# Patient Record
Sex: FEMALE | Race: OTHER | NOT HISPANIC OR LATINO
[De-identification: names, ages, dates, MRNs, and addresses within clinical notes are randomized per-mention and may not be internally consistent; named-entity substitution may affect disease eponyms.]

---

## 2017-05-10 ENCOUNTER — TRANSCRIPTION ENCOUNTER (OUTPATIENT)
Age: 37
End: 2017-05-10

## 2017-09-21 ENCOUNTER — INPATIENT (INPATIENT)
Facility: HOSPITAL | Age: 37
LOS: 3 days | Discharge: HOME | DRG: 342 | End: 2017-09-25
Attending: SURGERY | Admitting: SURGERY
Payer: COMMERCIAL

## 2017-09-21 VITALS
TEMPERATURE: 99 F | SYSTOLIC BLOOD PRESSURE: 146 MMHG | HEART RATE: 111 BPM | OXYGEN SATURATION: 96 % | RESPIRATION RATE: 18 BRPM | DIASTOLIC BLOOD PRESSURE: 88 MMHG

## 2017-09-21 LAB
BASOPHILS # BLD AUTO: 0 K/UL — SIGNIFICANT CHANGE UP (ref 0–0.2)
BASOPHILS NFR BLD AUTO: 0.2 % — SIGNIFICANT CHANGE UP (ref 0–2)
EOSINOPHIL # BLD AUTO: 0 K/UL — SIGNIFICANT CHANGE UP (ref 0–0.5)
EOSINOPHIL NFR BLD AUTO: 0.2 % — SIGNIFICANT CHANGE UP (ref 0–6)
HCT VFR BLD CALC: 39.3 % — SIGNIFICANT CHANGE UP (ref 34.5–45)
HGB BLD-MCNC: 13.2 G/DL — SIGNIFICANT CHANGE UP (ref 11.5–15.5)
LYMPHOCYTES # BLD AUTO: 1.6 K/UL — SIGNIFICANT CHANGE UP (ref 1–3.3)
LYMPHOCYTES # BLD AUTO: 10 % — LOW (ref 13–44)
MCHC RBC-ENTMCNC: 30.4 PG — SIGNIFICANT CHANGE UP (ref 27–34)
MCHC RBC-ENTMCNC: 33.5 GM/DL — SIGNIFICANT CHANGE UP (ref 32–36)
MCV RBC AUTO: 90.7 FL — SIGNIFICANT CHANGE UP (ref 80–100)
MONOCYTES # BLD AUTO: 1.1 K/UL — HIGH (ref 0–0.9)
MONOCYTES NFR BLD AUTO: 6.8 % — SIGNIFICANT CHANGE UP (ref 2–14)
NEUTROPHILS # BLD AUTO: 13.3 K/UL — HIGH (ref 1.8–7.4)
NEUTROPHILS NFR BLD AUTO: 82.7 % — HIGH (ref 43–77)
PLATELET # BLD AUTO: 246 K/UL — SIGNIFICANT CHANGE UP (ref 150–400)
RBC # BLD: 4.33 M/UL — SIGNIFICANT CHANGE UP (ref 3.8–5.2)
RBC # FLD: 12.5 % — SIGNIFICANT CHANGE UP (ref 10.3–14.5)
WBC # BLD: 16 K/UL — HIGH (ref 3.8–10.5)
WBC # FLD AUTO: 16 K/UL — HIGH (ref 3.8–10.5)

## 2017-09-21 PROCEDURE — 99285 EMERGENCY DEPT VISIT HI MDM: CPT | Mod: 25

## 2017-09-21 RX ORDER — ONDANSETRON 8 MG/1
4 TABLET, FILM COATED ORAL ONCE
Qty: 0 | Refills: 0 | Status: COMPLETED | OUTPATIENT
Start: 2017-09-21 | End: 2017-09-21

## 2017-09-21 RX ORDER — SODIUM CHLORIDE 9 MG/ML
3 INJECTION INTRAMUSCULAR; INTRAVENOUS; SUBCUTANEOUS ONCE
Qty: 0 | Refills: 0 | Status: COMPLETED | OUTPATIENT
Start: 2017-09-21 | End: 2017-09-21

## 2017-09-21 RX ORDER — SODIUM CHLORIDE 9 MG/ML
1000 INJECTION INTRAMUSCULAR; INTRAVENOUS; SUBCUTANEOUS ONCE
Qty: 0 | Refills: 0 | Status: COMPLETED | OUTPATIENT
Start: 2017-09-21 | End: 2017-09-21

## 2017-09-21 RX ORDER — ACETAMINOPHEN 500 MG
1000 TABLET ORAL ONCE
Qty: 0 | Refills: 0 | Status: COMPLETED | OUTPATIENT
Start: 2017-09-21 | End: 2017-09-21

## 2017-09-21 RX ADMIN — Medication 400 MILLIGRAM(S): at 23:23

## 2017-09-21 RX ADMIN — SODIUM CHLORIDE 3 MILLILITER(S): 9 INJECTION INTRAMUSCULAR; INTRAVENOUS; SUBCUTANEOUS at 23:22

## 2017-09-21 RX ADMIN — SODIUM CHLORIDE 1000 MILLILITER(S): 9 INJECTION INTRAMUSCULAR; INTRAVENOUS; SUBCUTANEOUS at 23:23

## 2017-09-21 RX ADMIN — ONDANSETRON 4 MILLIGRAM(S): 8 TABLET, FILM COATED ORAL at 23:23

## 2017-09-21 NOTE — ED PROVIDER NOTE - MEDICAL DECISION MAKING DETAILS
Upper abdominal pain migrating to RLQ x several hours. r/o appendicitis. CT a/p, labs, UA, pregnancy test. Tylenol and Zofran for Sx.

## 2017-09-21 NOTE — ED ADULT NURSE NOTE - OBJECTIVE STATEMENT
37 y.o female aaox3 ambulatory with h/o HTN p/w c/o intermittent generalized abdominal pain started this morning accompanied by nausea and chills, abdomen soft, nt/nd. Denies any blood in stool, no hematuria or dysuria. IV access inserted, due labs sent pending results.

## 2017-09-21 NOTE — ED PROVIDER NOTE - OBJECTIVE STATEMENT
37F with PMH of 2 vaginal deliveries p/w several hours of generalized abdominal pain a/w bloating and nausea. Pain started in the upper abdomen and migrated down. Pain was gradual in onset, waxing and waning. No change when eating or making a bowel movement (last BM toady after onset normal). No dysuria. No vomiting. Never had similar pain. No h/o gyn pathology. Took OTC gas medicine without improvement.

## 2017-09-22 ENCOUNTER — TRANSCRIPTION ENCOUNTER (OUTPATIENT)
Age: 37
End: 2017-09-22

## 2017-09-22 ENCOUNTER — RESULT REVIEW (OUTPATIENT)
Age: 37
End: 2017-09-22

## 2017-09-22 DIAGNOSIS — K35.80 UNSPECIFIED ACUTE APPENDICITIS: ICD-10-CM

## 2017-09-22 DIAGNOSIS — K37 UNSPECIFIED APPENDICITIS: ICD-10-CM

## 2017-09-22 LAB
ALBUMIN SERPL ELPH-MCNC: 4.5 G/DL — SIGNIFICANT CHANGE UP (ref 3.3–5)
ALP SERPL-CCNC: 61 U/L — SIGNIFICANT CHANGE UP (ref 40–120)
ALT FLD-CCNC: 41 U/L RC — SIGNIFICANT CHANGE UP (ref 10–45)
ANION GAP SERPL CALC-SCNC: 15 MMOL/L — SIGNIFICANT CHANGE UP (ref 5–17)
APPEARANCE UR: CLEAR — SIGNIFICANT CHANGE UP
AST SERPL-CCNC: 24 U/L — SIGNIFICANT CHANGE UP (ref 10–40)
BILIRUB SERPL-MCNC: 0.4 MG/DL — SIGNIFICANT CHANGE UP (ref 0.2–1.2)
BILIRUB UR-MCNC: NEGATIVE — SIGNIFICANT CHANGE UP
BLD GP AB SCN SERPL QL: NEGATIVE — SIGNIFICANT CHANGE UP
BUN SERPL-MCNC: 8 MG/DL — SIGNIFICANT CHANGE UP (ref 7–23)
CALCIUM SERPL-MCNC: 9.1 MG/DL — SIGNIFICANT CHANGE UP (ref 8.4–10.5)
CHLORIDE SERPL-SCNC: 104 MMOL/L — SIGNIFICANT CHANGE UP (ref 96–108)
CO2 SERPL-SCNC: 23 MMOL/L — SIGNIFICANT CHANGE UP (ref 22–31)
COLOR SPEC: YELLOW — SIGNIFICANT CHANGE UP
CREAT SERPL-MCNC: 0.8 MG/DL — SIGNIFICANT CHANGE UP (ref 0.5–1.3)
DIFF PNL FLD: NEGATIVE — SIGNIFICANT CHANGE UP
GLUCOSE SERPL-MCNC: 112 MG/DL — HIGH (ref 70–99)
GLUCOSE UR QL: NEGATIVE — SIGNIFICANT CHANGE UP
HCG SERPL-ACNC: <2 MIU/ML — SIGNIFICANT CHANGE UP
HCG UR QL: NEGATIVE — SIGNIFICANT CHANGE UP
KETONES UR-MCNC: ABNORMAL
LEUKOCYTE ESTERASE UR-ACNC: NEGATIVE — SIGNIFICANT CHANGE UP
LIDOCAIN IGE QN: 21 U/L — SIGNIFICANT CHANGE UP (ref 7–60)
NITRITE UR-MCNC: NEGATIVE — SIGNIFICANT CHANGE UP
PH UR: 7.5 — SIGNIFICANT CHANGE UP (ref 5–8)
POTASSIUM SERPL-MCNC: 3.8 MMOL/L — SIGNIFICANT CHANGE UP (ref 3.5–5.3)
POTASSIUM SERPL-SCNC: 3.8 MMOL/L — SIGNIFICANT CHANGE UP (ref 3.5–5.3)
PROT SERPL-MCNC: 7.1 G/DL — SIGNIFICANT CHANGE UP (ref 6–8.3)
PROT UR-MCNC: NEGATIVE — SIGNIFICANT CHANGE UP
RH IG SCN BLD-IMP: POSITIVE — SIGNIFICANT CHANGE UP
SODIUM SERPL-SCNC: 142 MMOL/L — SIGNIFICANT CHANGE UP (ref 135–145)
SP GR SPEC: 1.02 — SIGNIFICANT CHANGE UP (ref 1.01–1.02)
UROBILINOGEN FLD QL: NEGATIVE — SIGNIFICANT CHANGE UP

## 2017-09-22 PROCEDURE — 88304 TISSUE EXAM BY PATHOLOGIST: CPT | Mod: 26

## 2017-09-22 PROCEDURE — 74177 CT ABD & PELVIS W/CONTRAST: CPT | Mod: 26

## 2017-09-22 RX ORDER — HEPARIN SODIUM 5000 [USP'U]/ML
5000 INJECTION INTRAVENOUS; SUBCUTANEOUS EVERY 8 HOURS
Qty: 0 | Refills: 0 | Status: DISCONTINUED | OUTPATIENT
Start: 2017-09-22 | End: 2017-09-25

## 2017-09-22 RX ORDER — ONDANSETRON 8 MG/1
4 TABLET, FILM COATED ORAL ONCE
Qty: 0 | Refills: 0 | Status: COMPLETED | OUTPATIENT
Start: 2017-09-22 | End: 2017-09-22

## 2017-09-22 RX ORDER — IBUPROFEN 200 MG
400 TABLET ORAL EVERY 6 HOURS
Qty: 0 | Refills: 0 | Status: DISCONTINUED | OUTPATIENT
Start: 2017-09-22 | End: 2017-09-22

## 2017-09-22 RX ORDER — FAMOTIDINE 10 MG/ML
40 INJECTION INTRAVENOUS ONCE
Qty: 0 | Refills: 0 | Status: COMPLETED | OUTPATIENT
Start: 2017-09-22 | End: 2017-09-22

## 2017-09-22 RX ORDER — ACETAMINOPHEN 500 MG
1000 TABLET ORAL ONCE
Qty: 0 | Refills: 0 | Status: DISCONTINUED | OUTPATIENT
Start: 2017-09-22 | End: 2017-09-23

## 2017-09-22 RX ORDER — ACETAMINOPHEN 500 MG
1000 TABLET ORAL ONCE
Qty: 0 | Refills: 0 | Status: COMPLETED | OUTPATIENT
Start: 2017-09-22 | End: 2017-09-22

## 2017-09-22 RX ORDER — HYDROMORPHONE HYDROCHLORIDE 2 MG/ML
0.5 INJECTION INTRAMUSCULAR; INTRAVENOUS; SUBCUTANEOUS
Qty: 0 | Refills: 0 | Status: DISCONTINUED | OUTPATIENT
Start: 2017-09-22 | End: 2017-09-22

## 2017-09-22 RX ORDER — MORPHINE SULFATE 50 MG/1
4 CAPSULE, EXTENDED RELEASE ORAL ONCE
Qty: 0 | Refills: 0 | Status: COMPLETED | OUTPATIENT
Start: 2017-09-22 | End: 2017-09-22

## 2017-09-22 RX ORDER — PIPERACILLIN AND TAZOBACTAM 4; .5 G/20ML; G/20ML
3.38 INJECTION, POWDER, LYOPHILIZED, FOR SOLUTION INTRAVENOUS EVERY 8 HOURS
Qty: 0 | Refills: 0 | Status: DISCONTINUED | OUTPATIENT
Start: 2017-09-22 | End: 2017-09-25

## 2017-09-22 RX ORDER — OXYCODONE AND ACETAMINOPHEN 5; 325 MG/1; MG/1
2 TABLET ORAL EVERY 4 HOURS
Qty: 0 | Refills: 0 | Status: DISCONTINUED | OUTPATIENT
Start: 2017-09-22 | End: 2017-09-22

## 2017-09-22 RX ORDER — KETOROLAC TROMETHAMINE 30 MG/ML
30 SYRINGE (ML) INJECTION EVERY 6 HOURS
Qty: 0 | Refills: 0 | Status: DISCONTINUED | OUTPATIENT
Start: 2017-09-22 | End: 2017-09-23

## 2017-09-22 RX ORDER — INFLUENZA VIRUS VACCINE 15; 15; 15; 15 UG/.5ML; UG/.5ML; UG/.5ML; UG/.5ML
0.5 SUSPENSION INTRAMUSCULAR ONCE
Qty: 0 | Refills: 0 | Status: DISCONTINUED | OUTPATIENT
Start: 2017-09-22 | End: 2017-09-25

## 2017-09-22 RX ORDER — PIPERACILLIN AND TAZOBACTAM 4; .5 G/20ML; G/20ML
3.38 INJECTION, POWDER, LYOPHILIZED, FOR SOLUTION INTRAVENOUS ONCE
Qty: 0 | Refills: 0 | Status: COMPLETED | OUTPATIENT
Start: 2017-09-22 | End: 2017-09-22

## 2017-09-22 RX ORDER — SODIUM CHLORIDE 9 MG/ML
1000 INJECTION, SOLUTION INTRAVENOUS
Qty: 0 | Refills: 0 | Status: DISCONTINUED | OUTPATIENT
Start: 2017-09-22 | End: 2017-09-22

## 2017-09-22 RX ORDER — OXYCODONE AND ACETAMINOPHEN 5; 325 MG/1; MG/1
1 TABLET ORAL EVERY 4 HOURS
Qty: 0 | Refills: 0 | Status: DISCONTINUED | OUTPATIENT
Start: 2017-09-22 | End: 2017-09-22

## 2017-09-22 RX ORDER — MORPHINE SULFATE 50 MG/1
4 CAPSULE, EXTENDED RELEASE ORAL ONCE
Qty: 0 | Refills: 0 | Status: DISCONTINUED | OUTPATIENT
Start: 2017-09-22 | End: 2017-09-22

## 2017-09-22 RX ADMIN — HEPARIN SODIUM 5000 UNIT(S): 5000 INJECTION INTRAVENOUS; SUBCUTANEOUS at 23:13

## 2017-09-22 RX ADMIN — FAMOTIDINE 40 MILLIGRAM(S): 10 INJECTION INTRAVENOUS at 02:45

## 2017-09-22 RX ADMIN — Medication 30 MILLIGRAM(S): at 23:13

## 2017-09-22 RX ADMIN — ONDANSETRON 4 MILLIGRAM(S): 8 TABLET, FILM COATED ORAL at 10:55

## 2017-09-22 RX ADMIN — HYDROMORPHONE HYDROCHLORIDE 0.5 MILLIGRAM(S): 2 INJECTION INTRAMUSCULAR; INTRAVENOUS; SUBCUTANEOUS at 08:07

## 2017-09-22 RX ADMIN — SODIUM CHLORIDE 75 MILLILITER(S): 9 INJECTION, SOLUTION INTRAVENOUS at 06:34

## 2017-09-22 RX ADMIN — MORPHINE SULFATE 4 MILLIGRAM(S): 50 CAPSULE, EXTENDED RELEASE ORAL at 02:59

## 2017-09-22 RX ADMIN — Medication 400 MILLIGRAM(S): at 06:00

## 2017-09-22 RX ADMIN — SODIUM CHLORIDE 75 MILLILITER(S): 9 INJECTION, SOLUTION INTRAVENOUS at 16:02

## 2017-09-22 RX ADMIN — Medication 1000 MILLIGRAM(S): at 06:25

## 2017-09-22 RX ADMIN — PIPERACILLIN AND TAZOBACTAM 200 GRAM(S): 4; .5 INJECTION, POWDER, LYOPHILIZED, FOR SOLUTION INTRAVENOUS at 03:01

## 2017-09-22 RX ADMIN — Medication 30 MILLIGRAM(S): at 16:35

## 2017-09-22 RX ADMIN — Medication 1000 MILLIGRAM(S): at 03:06

## 2017-09-22 RX ADMIN — PIPERACILLIN AND TAZOBACTAM 25 GRAM(S): 4; .5 INJECTION, POWDER, LYOPHILIZED, FOR SOLUTION INTRAVENOUS at 14:14

## 2017-09-22 RX ADMIN — PIPERACILLIN AND TAZOBACTAM 25 GRAM(S): 4; .5 INJECTION, POWDER, LYOPHILIZED, FOR SOLUTION INTRAVENOUS at 23:13

## 2017-09-22 RX ADMIN — HYDROMORPHONE HYDROCHLORIDE 0.5 MILLIGRAM(S): 2 INJECTION INTRAMUSCULAR; INTRAVENOUS; SUBCUTANEOUS at 07:51

## 2017-09-22 RX ADMIN — Medication 30 MILLIGRAM(S): at 17:10

## 2017-09-22 RX ADMIN — Medication 30 MILLIGRAM(S): at 23:43

## 2017-09-22 NOTE — DISCHARGE NOTE ADULT - HOSPITAL COURSE
37 year old woman presented to [facility] with abdominal pain and diagnosed with acute appendicitis. She was started on IV antibiotics and then taken to the operating room where she underwent a laparoscopic appendectomy. The patient tolerated the procedure well and was extubated and transported to the recovery room. Her diet was advanced as tolerated and she had a prescription for Percocet sent to the pharmacy. She was then discharged to home without any other issues. 37 year old woman presented to NS with abdominal pain and diagnosed with acute appendicitis. She was started on IV antibiotics and then taken to the operating room where she underwent a laparoscopic appendectomy. The patient tolerated the procedure well and was extubated and transported to the recovery room. Her diet was advanced as tolerated and she had a prescription for Percocet sent to the pharmacy. Patient experienced fever to 38.7C post operative and was mildly tachycardiac. She was admitted overnight for IV antibiotics and supportive care. At the time of discharge, the patient was hemodynamically stable, was tolerating PO diet, was voiding urine and passing stool, was ambulating, and was comfortable with adequate pain control. The patient was instructed to follow up with Dr. Bañuelos within 1-2 weeks after discharge from the hospital. The patient felt comfortable with discharge. The patient was discharged to home on oral antibiotics for 5 days. The patient had no other issues. 37 year old woman presented to NS with abdominal pain and diagnosed with acute appendicitis. She was started on IV antibiotics and then taken to the operating room where she underwent a laparoscopic appendectomy. The patient tolerated the procedure well and was extubated and transported to the recovery room. Her diet was advanced as tolerated and she had a prescription for Percocet sent to the pharmacy. Patient experienced fever to 38.7C post operative and was mildly tachycardiac. She was admitted overnight for IV antibiotics and supportive care. At the time of discharge, the patient was hemodynamically stable, was tolerating PO diet, was voiding urine and passing stool, was ambulating, and was comfortable with adequate pain control. The patient was instructed to follow up with Dr. Bañuelos within 1-2 weeks after discharge from the hospital. The patient felt comfortable with discharge. The patient was discharged to home on POD#1 on oral antibiotics for 5 days. The patient had no other issues. 37 year old woman presented to NS with abdominal pain and diagnosed with acute appendicitis. She was started on IV antibiotics and then taken to the operating room where she underwent a laparoscopic appendectomy. The patient tolerated the procedure well and was extubated and transported to the recovery room. Her diet was advanced as tolerated and she had a prescription for Percocet sent to the pharmacy. Patient experienced fever to 38.7C post operative and was mildly tachycardiac. She was admitted overnight for IV antibiotics and supportive care. At the time of discharge, the patient was hemodynamically stable, was tolerating PO diet, was voiding urine and passing stool, was ambulating, and was comfortable with adequate pain control. The patient was instructed to follow up with Dr. Bañuelos within 1-2 weeks after discharge from the hospital. The patient felt comfortable with discharge. The patient was discharged to home on oral antibiotics for 7 days. The patient had no other issues.

## 2017-09-22 NOTE — H&P ADULT - NSHPPHYSICALEXAM_GEN_ALL_CORE
Gen: resting comfortably, NAD  Chest: no resp distress  Abd: soft, mild diffuse tenderness, more in RLQ, non-distended

## 2017-09-22 NOTE — PROGRESS NOTE ADULT - SUBJECTIVE AND OBJECTIVE BOX
[facility] PROGRESS NOTE    POST OPERATIVE DAY #: 0      SUBJECTIVE: Pt seen in PACU. reporting some diffuse abdominal pain. Tmax 100.4 -/-      Vital Signs Last 24 Hrs  T(C): 37.3 (22 Sep 2017 07:30), Max: 38.7 (22 Sep 2017 06:45)  T(F): 99.1 (22 Sep 2017 07:30), Max: 101.7 (22 Sep 2017 06:45)  HR: 95 (22 Sep 2017 08:07) (81 - 114)  BP: 118/69 (22 Sep 2017 07:30) (113/76 - 152/86)  BP(mean): 87 (22 Sep 2017 07:30) (86 - 104)  RR: 16 (22 Sep 2017 08:07) (15 - 18)  SpO2: 94% (22 Sep 2017 08:07) (93% - 99%)    Physical Exam  General: awake, alert, in NAD    Pulm: respirations unlabored, no increased WOB  Abdomen: soft, mildly distended, appropriately tender, incision c/d/i  Extremities: Grossly symmetric    I&O's Summary    21 Sep 2017 07:  -  22 Sep 2017 07:00  --------------------------------------------------------  IN: 75 mL / OUT: 0 mL / NET: 75 mL    22 Sep 2017 07:  -  22 Sep 2017 08:23  --------------------------------------------------------  IN: 75 mL / OUT: 300 mL / NET: -225 mL      I&O's Detail    21 Sep 2017 07:  -  22 Sep 2017 07:00  --------------------------------------------------------  IN:    lactated ringers.: 75 mL  Total IN: 75 mL    OUT:  Total OUT: 0 mL    Total NET: 75 mL      22 Sep 2017 07:01  -  22 Sep 2017 08:23  --------------------------------------------------------  IN:    lactated ringers.: 75 mL  Total IN: 75 mL    OUT:    Voided: 300 mL  Total OUT: 300 mL    Total NET: -225 mL          MEDICATIONS  (STANDING):  lactated ringers. 1000 milliLiter(s) (75 mL/Hr) IV Continuous <Continuous>    MEDICATIONS  (PRN):  HYDROmorphone  Injectable 0.5 milliGRAM(s) IV Push every 10 minutes PRN Moderate Pain (4 - 6)  ondansetron Injectable 4 milliGRAM(s) IV Push once PRN Nausea and/or Vomiting  oxyCODONE    5 mG/acetaminophen 325 mG 1 Tablet(s) Oral every 4 hours PRN Moderate Pain (4 - 6)  oxyCODONE    5 mG/acetaminophen 325 mG 2 Tablet(s) Oral every 4 hours PRN Severe Pain (7 - 10)      LABS:                        13.2   16.0  )-----------( 246      ( 21 Sep 2017 23:36 )             39.3     09-21    142  |  104  |  8   ----------------------------<  112<H>  3.8   |  23  |  0.80    Ca    9.1      21 Sep 2017 23:36    TPro  7.1  /  Alb  4.5  /  TBili  0.4  /  DBili  x   /  AST  24  /  ALT  41  /  AlkPhos  61  -      Urinalysis Basic - ( 22 Sep 2017 00:14 )    Color: Yellow / Appearance: Clear / S.021 / pH: x  Gluc: x / Ketone: Trace  / Bili: Negative / Urobili: Negative   Blood: x / Protein: Negative / Nitrite: Negative   Leuk Esterase: Negative / RBC: x / WBC x   Sq Epi: x / Non Sq Epi: x / Bacteria: x        RADIOLOGY & ADDITIONAL STUDIES:  No new studies

## 2017-09-22 NOTE — DISCHARGE NOTE ADULT - MEDICATION SUMMARY - MEDICATIONS TO STOP TAKING
I will STOP taking the medications listed below when I get home from the hospital:    Levaquin 750 mg oral tablet  -- 1 tab(s) by mouth once a day  -- Avoid prolonged or excessive exposure to direct and/or artificial sunlight while taking this medication.  Do not take dairy products, antacids, or iron preparations within one hour of this medication.  Finish all this medication unless otherwise directed by prescriber.  May cause drowsiness or dizziness.  Medication should be taken with plenty of water.    Cipro 500 mg oral tablet  -- 1 tab(s) by mouth 2 times a day  -- Avoid prolonged or excessive exposure to direct and/or artificial sunlight while taking this medication.  Check with your doctor before becoming pregnant.  Do not take dairy products, antacids, or iron preparations within one hour of this medication.  Finish all this medication unless otherwise directed by prescriber.  Medication should be taken with plenty of water. I will STOP taking the medications listed below when I get home from the hospital:  None

## 2017-09-22 NOTE — BRIEF OPERATIVE NOTE - PROCEDURE
<<-----Click on this checkbox to enter Procedure Laparoscopic appendectomy  09/22/2017    Active  [id number]

## 2017-09-22 NOTE — H&P ADULT - NSHPLABSRESULTS_GEN_ALL_CORE
13.2   16.0  )-----------( 246      ( 21 Sep 2017 23:36 )             39.3   09-21    142  |  104  |  8   ----------------------------<  112<H>  3.8   |  23  |  0.80    Ca    9.1      21 Sep 2017 23:36    TPro  7.1  /  Alb  4.5  /  TBili  0.4  /  DBili  x   /  AST  24  /  ALT  41  /  AlkPhos  61  09-21

## 2017-09-22 NOTE — H&P ADULT - HISTORY OF PRESENT ILLNESS
Pt is 37F with no PMH, abd pain since 11am, the day prior to presentation.  Some nausea.  No vomiting. Chills but no fevers.  Came to the ED for evaluation, noted on CT scan to have acute appendicitis.

## 2017-09-22 NOTE — H&P ADULT - ASSESSMENT
Pt is 37F with acute appendicitis  -admit to surgery   -NPO/IVF  -OR pending for appendectomy    case discussed with attending, Dr. Allen

## 2017-09-22 NOTE — DISCHARGE NOTE ADULT - CARE PLAN
Principal Discharge DX:	Appendicitis  Goal:	s/p laparoscopic appendectomy  Instructions for follow-up, activity and diet:	Okay to shower In 24 hours. Leave steri-strips intact. Let soap and water run over incisions and then pat dry, do not scrub incisions.

## 2017-09-22 NOTE — DISCHARGE NOTE ADULT - PLAN OF CARE
s/p laparoscopic appendectomy Okay to shower In 24 hours. Leave steri-strips intact. Let soap and water run over incisions and then pat dry, do not scrub incisions.

## 2017-09-22 NOTE — PROGRESS NOTE ADULT - ASSESSMENT
38Yo female s/p laparoscopic appendectomy for uncomplicated appendicitis, POD 0, currently stable     - monitor vitals, patient currently afebrile   - start Zosyn   - DVT ppx  - CLD and advance as tolerated     [facility] [id number] 36YO female s/p laparoscopic appendectomy for uncomplicated appendicitis, POD 0, immediate post-op fever that since resolved, currently stable     - monitor vitals, patient currently afebrile   - start Zosyn   - DVT ppx  - CLD and advance as tolerated     [facility] [id number]

## 2017-09-22 NOTE — DISCHARGE NOTE ADULT - CARE PROVIDER_API CALL
Karis Bañuelos), ColonRectal Surgery; Surgery  [address] [facility]  [address] [place] [id number]  Phone: [phone]  Fax: [phone]

## 2017-09-22 NOTE — H&P ADULT - ATTENDING COMMENTS
38 yo female with acute abd pain, CT c/w acute appendicitis. I recommended lap appy, poss open, discussed details, R/B/A of the procedure, all questions answered.

## 2017-09-22 NOTE — DISCHARGE NOTE ADULT - MEDICATION SUMMARY - MEDICATIONS TO TAKE
I will START or STAY ON the medications listed below when I get home from the hospital:    Percocet 5/325 oral tablet  -- 1 tab(s) by mouth every 4 hours, As Needed -for moderate pain MDD:6 tabs   -- Caution federal law prohibits the transfer of this drug to any person other  than the person for whom it was prescribed.  May cause drowsiness.  Alcohol may intensify this effect.  Use care when operating dangerous machinery.  This prescription cannot be refilled.  This product contains acetaminophen.  Do not use  with any other product containing acetaminophen to prevent possible liver damage.  Using more of this medication than prescribed may cause serious breathing problems.    -- Indication: For PRN pain I will START or STAY ON the medications listed below when I get home from the hospital:    Augmentin 875 mg-125 mg oral tablet  -- 1 milligram(s) by mouth every 12 hours   -- Finish all this medication unless otherwise directed by prescriber.  Take with food or milk.    -- Indication: For Appendicitis I will START or STAY ON the medications listed below when I get home from the hospital:    Augmentin 875 mg-125 mg oral tablet  -- 1 milligram(s) by mouth every 12 hours   -- Finish all this medication unless otherwise directed by prescriber.  Take with food or milk.    -- Indication: For Antibiotic

## 2017-09-23 LAB
APPEARANCE UR: ABNORMAL
BILIRUB UR-MCNC: NEGATIVE — SIGNIFICANT CHANGE UP
COLOR SPEC: YELLOW — SIGNIFICANT CHANGE UP
COMMENT - URINE: SIGNIFICANT CHANGE UP
DIFF PNL FLD: NEGATIVE — SIGNIFICANT CHANGE UP
EPI CELLS # UR: SIGNIFICANT CHANGE UP /HPF
GLUCOSE UR QL: NEGATIVE — SIGNIFICANT CHANGE UP
KETONES UR-MCNC: NEGATIVE — SIGNIFICANT CHANGE UP
LEUKOCYTE ESTERASE UR-ACNC: NEGATIVE — SIGNIFICANT CHANGE UP
NITRITE UR-MCNC: NEGATIVE — SIGNIFICANT CHANGE UP
PH UR: 8 — SIGNIFICANT CHANGE UP (ref 5–8)
PROT UR-MCNC: 30 MG/DL
RBC CASTS # UR COMP ASSIST: SIGNIFICANT CHANGE UP /HPF (ref 0–2)
SP GR SPEC: 1.03 — HIGH (ref 1.01–1.02)
UROBILINOGEN FLD QL: 1

## 2017-09-23 PROCEDURE — 74000: CPT | Mod: 26

## 2017-09-23 PROCEDURE — 71010: CPT | Mod: 26

## 2017-09-23 RX ORDER — IBUPROFEN 200 MG
400 TABLET ORAL EVERY 6 HOURS
Qty: 0 | Refills: 0 | Status: DISCONTINUED | OUTPATIENT
Start: 2017-09-23 | End: 2017-09-25

## 2017-09-23 RX ORDER — POTASSIUM CHLORIDE 20 MEQ
40 PACKET (EA) ORAL ONCE
Qty: 0 | Refills: 0 | Status: COMPLETED | OUTPATIENT
Start: 2017-09-23 | End: 2017-09-23

## 2017-09-23 RX ORDER — ACETAMINOPHEN 500 MG
650 TABLET ORAL EVERY 6 HOURS
Qty: 0 | Refills: 0 | Status: DISCONTINUED | OUTPATIENT
Start: 2017-09-23 | End: 2017-09-25

## 2017-09-23 RX ORDER — ACETAMINOPHEN 500 MG
1000 TABLET ORAL ONCE
Qty: 0 | Refills: 0 | Status: COMPLETED | OUTPATIENT
Start: 2017-09-23 | End: 2017-09-23

## 2017-09-23 RX ADMIN — PIPERACILLIN AND TAZOBACTAM 25 GRAM(S): 4; .5 INJECTION, POWDER, LYOPHILIZED, FOR SOLUTION INTRAVENOUS at 22:06

## 2017-09-23 RX ADMIN — PIPERACILLIN AND TAZOBACTAM 25 GRAM(S): 4; .5 INJECTION, POWDER, LYOPHILIZED, FOR SOLUTION INTRAVENOUS at 13:52

## 2017-09-23 RX ADMIN — Medication 650 MILLIGRAM(S): at 13:49

## 2017-09-23 RX ADMIN — Medication 30 MILLIGRAM(S): at 06:44

## 2017-09-23 RX ADMIN — Medication 30 MILLIGRAM(S): at 06:14

## 2017-09-23 RX ADMIN — Medication 400 MILLIGRAM(S): at 16:30

## 2017-09-23 RX ADMIN — PIPERACILLIN AND TAZOBACTAM 25 GRAM(S): 4; .5 INJECTION, POWDER, LYOPHILIZED, FOR SOLUTION INTRAVENOUS at 05:13

## 2017-09-23 RX ADMIN — HEPARIN SODIUM 5000 UNIT(S): 5000 INJECTION INTRAVENOUS; SUBCUTANEOUS at 05:13

## 2017-09-23 RX ADMIN — Medication 400 MILLIGRAM(S): at 17:00

## 2017-09-23 RX ADMIN — HEPARIN SODIUM 5000 UNIT(S): 5000 INJECTION INTRAVENOUS; SUBCUTANEOUS at 13:50

## 2017-09-23 RX ADMIN — Medication 40 MILLIEQUIVALENT(S): at 13:48

## 2017-09-23 RX ADMIN — HEPARIN SODIUM 5000 UNIT(S): 5000 INJECTION INTRAVENOUS; SUBCUTANEOUS at 22:06

## 2017-09-23 RX ADMIN — Medication 400 MILLIGRAM(S): at 00:18

## 2017-09-23 NOTE — PROGRESS NOTE ADULT - ASSESSMENT
36YO female s/p laparoscopic appendectomy for uncomplicated appendicitis, POD 0, immediate post-op fever that since resolved, currently stable     - monitor vitals, patient currently afebrile   - C/w Zosyn   - DVT ppx  - Diet, Regular  - D/c to home    [facility] [id number] 36YO female s/p laparoscopic appendectomy for uncomplicated appendicitis, POD 0, immediate post-op fever that since resolved, currently stable     - ordered CXR and UA - fever possibly due to atelectasis POD #1  - monitor vitals, patient currently afebrile   - C/w Zosyn   - DVT ppx  - Diet, Regular  - D/c to home    [facility] [id number]

## 2017-09-23 NOTE — PROGRESS NOTE ADULT - SUBJECTIVE AND OBJECTIVE BOX
[facility] PROGRESS NOTE    SUBJECTIVE: Pt seen in PACU. reporting some diffuse abdominal pain. Tmax 100.4 -/-    Vital Signs Last 24 Hrs  T(C): 37.8 (23 Sep 2017 06:09), Max: 38.7 (22 Sep 2017 06:45)  T(F): 100 (23 Sep 2017 06:09), Max: 101.7 (22 Sep 2017 06:45)  HR: 80 (23 Sep 2017 06:09) (67 - 114)  BP: 121/81 (23 Sep 2017 06:09) (104/66 - 141/79)  BP(mean): 87 (22 Sep 2017 07:30) (86 - 104)  RR: 19 (23 Sep 2017 06:09) (14 - 19)  SpO2: 100% (23 Sep 2017 06:09) (93% - 100%)    Physical Exam  General: awake, alert, in NAD    Pulm: respirations unlabored, no increased WOB  Abdomen: soft, mildly distended, appropriately tender, incision c/d/i  Extremities: Grossly symmetric    I&O's Summary    21 Sep 2017 07:  -  22 Sep 2017 07:00  --------------------------------------------------------  IN: 75 mL / OUT: 0 mL / NET: 75 mL    22 Sep 2017 07:  -  23 Sep 2017 06:17  --------------------------------------------------------  IN: 1210 mL / OUT: 2450 mL / NET: -1240 mL      I&O's Detail    21 Sep 2017 07:  -  22 Sep 2017 07:00  --------------------------------------------------------  IN:    lactated ringers.: 75 mL  Total IN: 75 mL    OUT:  Total OUT: 0 mL    Total NET: 75 mL      22 Sep 2017 07:01  -  23 Sep 2017 06:17  --------------------------------------------------------  IN:    IV PiggyBack: 25 mL    lactated ringers.: 825 mL    Oral Fluid: 360 mL  Total IN: 1210 mL    OUT:    Voided: 2450 mL  Total OUT: 2450 mL    Total NET: -1240 mL          MEDICATIONS  (STANDING):  piperacillin/tazobactam IVPB. 3.375 Gram(s) IV Intermittent every 8 hours  heparin  Injectable 5000 Unit(s) SubCutaneous every 8 hours  influenza   Vaccine 0.5 milliLiter(s) IntraMuscular once    MEDICATIONS  (PRN):  ketorolac   Injectable 30 milliGRAM(s) IV Push every 6 hours PRN Moderate Pain (4 - 6)      LABS:                        13.2   16.0  )-----------( 246      ( 21 Sep 2017 23:36 )             39.3     -    142  |  104  |  8   ----------------------------<  112<H>  3.8   |  23  |  0.80    Ca    9.1      21 Sep 2017 23:36    TPro  7.1  /  Alb  4.5  /  TBili  0.4  /  DBili  x   /  AST  24  /  ALT  41  /  AlkPhos  61        Urinalysis Basic - ( 22 Sep 2017 00:14 )    Color: Yellow / Appearance: Clear / S.021 / pH: x  Gluc: x / Ketone: Trace  / Bili: Negative / Urobili: Negative   Blood: x / Protein: Negative / Nitrite: Negative   Leuk Esterase: Negative / RBC: x / WBC x   Sq Epi: x / Non Sq Epi: x / Bacteria: x        RADIOLOGY & ADDITIONAL STUDIES: [facility] PROGRESS NOTE    SUBJECTIVE: Pt seen in at bedside. Tmax 38.7C at 2322 and , received IV Tylenol. Currently afebrile and hemodynamically stable. Denies NV. Tolerating a regular diet. +/-    Vital Signs Last 24 Hrs  T(C): 37.8 (23 Sep 2017 06:09), Max: 38.7 (22 Sep 2017 06:45)  T(F): 100 (23 Sep 2017 06:09), Max: 101.7 (22 Sep 2017 06:45)  HR: 80 (23 Sep 2017 06:09) (67 - 114)  BP: 121/81 (23 Sep 2017 06:09) (104/66 - 141/79)  BP(mean): 87 (22 Sep 2017 07:30) (86 - 104)  RR: 19 (23 Sep 2017 06:09) (14 - 19)  SpO2: 100% (23 Sep 2017 06:09) (93% - 100%)    Physical Exam  General: awake, alert, in NAD    Pulm: respirations unlabored, no increased WOB  Abdomen: soft, mildly distended, appropriately tender, incision c/d/i  Extremities: Grossly symmetric    I&O's Summary    21 Sep 2017 07:  -  22 Sep 2017 07:00  --------------------------------------------------------  IN: 75 mL / OUT: 0 mL / NET: 75 mL    22 Sep 2017 07:  -  23 Sep 2017 06:17  --------------------------------------------------------  IN: 1210 mL / OUT: 2450 mL / NET: -1240 mL      I&O's Detail    21 Sep 2017 07:  -  22 Sep 2017 07:00  --------------------------------------------------------  IN:    lactated ringers.: 75 mL  Total IN: 75 mL    OUT:  Total OUT: 0 mL    Total NET: 75 mL      22 Sep 2017 07:01  -  23 Sep 2017 06:17  --------------------------------------------------------  IN:    IV PiggyBack: 25 mL    lactated ringers.: 825 mL    Oral Fluid: 360 mL  Total IN: 1210 mL    OUT:    Voided: 2450 mL  Total OUT: 2450 mL    Total NET: -1240 mL          MEDICATIONS  (STANDING):  piperacillin/tazobactam IVPB. 3.375 Gram(s) IV Intermittent every 8 hours  heparin  Injectable 5000 Unit(s) SubCutaneous every 8 hours  influenza   Vaccine 0.5 milliLiter(s) IntraMuscular once    MEDICATIONS  (PRN):  ketorolac   Injectable 30 milliGRAM(s) IV Push every 6 hours PRN Moderate Pain (4 - 6)      LABS:                        13.2   16.0  )-----------( 246      ( 21 Sep 2017 23:36 )             39.3     09-21    142  |  104  |  8   ----------------------------<  112<H>  3.8   |  23  |  0.80    Ca    9.1      21 Sep 2017 23:36    TPro  7.1  /  Alb  4.5  /  TBili  0.4  /  DBili  x   /  AST  24  /  ALT  41  /  AlkPhos  61  -      Urinalysis Basic - ( 22 Sep 2017 00:14 )    Color: Yellow / Appearance: Clear / S.021 / pH: x  Gluc: x / Ketone: Trace  / Bili: Negative / Urobili: Negative   Blood: x / Protein: Negative / Nitrite: Negative   Leuk Esterase: Negative / RBC: x / WBC x   Sq Epi: x / Non Sq Epi: x / Bacteria: x        RADIOLOGY & ADDITIONAL STUDIES:

## 2017-09-24 LAB
ALBUMIN SERPL ELPH-MCNC: 3.7 G/DL — SIGNIFICANT CHANGE UP (ref 3.3–5)
ALP SERPL-CCNC: 69 U/L — SIGNIFICANT CHANGE UP (ref 40–120)
ALT FLD-CCNC: 74 U/L RC — HIGH (ref 10–45)
ANION GAP SERPL CALC-SCNC: 13 MMOL/L — SIGNIFICANT CHANGE UP (ref 5–17)
AST SERPL-CCNC: 45 U/L — HIGH (ref 10–40)
BILIRUB SERPL-MCNC: 0.3 MG/DL — SIGNIFICANT CHANGE UP (ref 0.2–1.2)
BUN SERPL-MCNC: 6 MG/DL — LOW (ref 7–23)
CALCIUM SERPL-MCNC: 8.6 MG/DL — SIGNIFICANT CHANGE UP (ref 8.4–10.5)
CHLORIDE SERPL-SCNC: 107 MMOL/L — SIGNIFICANT CHANGE UP (ref 96–108)
CO2 SERPL-SCNC: 20 MMOL/L — LOW (ref 22–31)
CREAT SERPL-MCNC: 0.69 MG/DL — SIGNIFICANT CHANGE UP (ref 0.5–1.3)
CULTURE RESULTS: NO GROWTH — SIGNIFICANT CHANGE UP
GLUCOSE SERPL-MCNC: 103 MG/DL — HIGH (ref 70–99)
HCT VFR BLD CALC: 34.9 % — SIGNIFICANT CHANGE UP (ref 34.5–45)
HGB BLD-MCNC: 11.8 G/DL — SIGNIFICANT CHANGE UP (ref 11.5–15.5)
MCHC RBC-ENTMCNC: 30.6 PG — SIGNIFICANT CHANGE UP (ref 27–34)
MCHC RBC-ENTMCNC: 33.8 GM/DL — SIGNIFICANT CHANGE UP (ref 32–36)
MCV RBC AUTO: 90.7 FL — SIGNIFICANT CHANGE UP (ref 80–100)
PLATELET # BLD AUTO: 187 K/UL — SIGNIFICANT CHANGE UP (ref 150–400)
POTASSIUM SERPL-MCNC: 4.2 MMOL/L — SIGNIFICANT CHANGE UP (ref 3.5–5.3)
POTASSIUM SERPL-SCNC: 4.2 MMOL/L — SIGNIFICANT CHANGE UP (ref 3.5–5.3)
PROT SERPL-MCNC: 6.4 G/DL — SIGNIFICANT CHANGE UP (ref 6–8.3)
RBC # BLD: 3.84 M/UL — SIGNIFICANT CHANGE UP (ref 3.8–5.2)
RBC # FLD: 12.4 % — SIGNIFICANT CHANGE UP (ref 10.3–14.5)
SODIUM SERPL-SCNC: 140 MMOL/L — SIGNIFICANT CHANGE UP (ref 135–145)
SPECIMEN SOURCE: SIGNIFICANT CHANGE UP
WBC # BLD: 6.1 K/UL — SIGNIFICANT CHANGE UP (ref 3.8–10.5)
WBC # FLD AUTO: 6.1 K/UL — SIGNIFICANT CHANGE UP (ref 3.8–10.5)

## 2017-09-24 RX ORDER — PANTOPRAZOLE SODIUM 20 MG/1
40 TABLET, DELAYED RELEASE ORAL ONCE
Qty: 0 | Refills: 0 | Status: COMPLETED | OUTPATIENT
Start: 2017-09-24 | End: 2017-09-24

## 2017-09-24 RX ORDER — OXYCODONE HYDROCHLORIDE 5 MG/1
5 TABLET ORAL ONCE
Qty: 0 | Refills: 0 | Status: DISCONTINUED | OUTPATIENT
Start: 2017-09-24 | End: 2017-09-25

## 2017-09-24 RX ORDER — ONDANSETRON 8 MG/1
4 TABLET, FILM COATED ORAL ONCE
Qty: 0 | Refills: 0 | Status: COMPLETED | OUTPATIENT
Start: 2017-09-24 | End: 2017-09-24

## 2017-09-24 RX ORDER — FAMOTIDINE 10 MG/ML
20 INJECTION INTRAVENOUS DAILY
Qty: 0 | Refills: 0 | Status: DISCONTINUED | OUTPATIENT
Start: 2017-09-24 | End: 2017-09-25

## 2017-09-24 RX ADMIN — ONDANSETRON 4 MILLIGRAM(S): 8 TABLET, FILM COATED ORAL at 04:44

## 2017-09-24 RX ADMIN — FAMOTIDINE 20 MILLIGRAM(S): 10 INJECTION INTRAVENOUS at 11:45

## 2017-09-24 RX ADMIN — PANTOPRAZOLE SODIUM 40 MILLIGRAM(S): 20 TABLET, DELAYED RELEASE ORAL at 14:15

## 2017-09-24 RX ADMIN — PIPERACILLIN AND TAZOBACTAM 25 GRAM(S): 4; .5 INJECTION, POWDER, LYOPHILIZED, FOR SOLUTION INTRAVENOUS at 06:06

## 2017-09-24 RX ADMIN — PIPERACILLIN AND TAZOBACTAM 25 GRAM(S): 4; .5 INJECTION, POWDER, LYOPHILIZED, FOR SOLUTION INTRAVENOUS at 14:15

## 2017-09-24 RX ADMIN — ONDANSETRON 4 MILLIGRAM(S): 8 TABLET, FILM COATED ORAL at 21:02

## 2017-09-24 RX ADMIN — ONDANSETRON 4 MILLIGRAM(S): 8 TABLET, FILM COATED ORAL at 18:21

## 2017-09-24 RX ADMIN — Medication 200 MILLIGRAM(S): at 23:50

## 2017-09-24 RX ADMIN — HEPARIN SODIUM 5000 UNIT(S): 5000 INJECTION INTRAVENOUS; SUBCUTANEOUS at 06:06

## 2017-09-24 RX ADMIN — HEPARIN SODIUM 5000 UNIT(S): 5000 INJECTION INTRAVENOUS; SUBCUTANEOUS at 14:15

## 2017-09-24 NOTE — PROVIDER CONTACT NOTE (MEDICATION) - ACTION/TREATMENT ORDERED:
provider aware pt educated and verbalized understanding. pt continues to refuse. will encourage pt to take for next dose in AM. will continue to monitor pt

## 2017-09-24 NOTE — PROGRESS NOTE ADULT - ATTENDING COMMENTS
I saw and examined the pt and discussed the tx plan with the House Staff. I agree with the exam and plan as documented in the surgery resident's note from today with additions as below.  Has new epigastric burning pain. + nausea. + flatus, large amounts.  Has defervesced, Tm 37.8 24 hrs ago.  Abdomen soft, with mild epigastric tenderness, ND.  Incisions /d/i.  Labs pending.  Will give Protonix (h/o prior use, now on pepcid at home) and one dose of oxycodone (has only taken Motrin or Tylenol, last dose yesterday) and monitor symptoms.   Karis Bañuelos MD
I saw and examined the pt and discussed the tx plan with the House Staff. I agree with the exam and plan as documented in the surgery resident's note from today.  + fever immediately after the surgery, feels well currently, abdomen soft, NT, ND, will give ABX, monitor today, plan on continuing ABX upon discharge for 5 days total.  Karis Bañuelos MD
I saw and evaluated patient this morning.  I agree with resident assessment and plan except for:  If stays afebrile through day and feels well then will go home this evening.  WBC has normalized

## 2017-09-24 NOTE — PROVIDER CONTACT NOTE (MEDICATION) - ASSESSMENT
vss. pt a&ox4. pt ambulatory. pt verbalized zosyn may be making her nauseous., pt verbalized that she does not need heparin subq.

## 2017-09-24 NOTE — PROGRESS NOTE ADULT - SUBJECTIVE AND OBJECTIVE BOX
[facility] PROGRESS NOTE    POST OPERATIVE DAY #: 2      SUBJECTIVE: Pt seen at bedside. AVSS. Reports nausea overnight, improving with Zofran. Denies vomiting. +/-      Vital Signs Last 24 Hrs  T(C): 37.6 (24 Sep 2017 06:13), Max: 37.8 (23 Sep 2017 13:40)  T(F): 99.6 (24 Sep 2017 06:13), Max: 100.1 (23 Sep 2017 13:40)  HR: 80 (24 Sep 2017 06:13) (76 - 93)  BP: 130/76 (24 Sep 2017 06:13) (117/78 - 134/88)  BP(mean): --  RR: 17 (24 Sep 2017 06:13) (16 - 18)  SpO2: 97% (24 Sep 2017 06:13) (97% - 99%)    Physical Exam  General: awake, alert,    Pulm: respirations unlabored, no increased WOB  Abdomen: soft, mildly distended, NT, incision c/d/i  Extremities: Grossly symmetric    I&O's Summary    23 Sep 2017 07:  -  24 Sep 2017 07:00  --------------------------------------------------------  IN: 1020 mL / OUT: 600 mL / NET: 420 mL      I&O's Detail    23 Sep 2017 07:  -  24 Sep 2017 07:00  --------------------------------------------------------  IN:    IV PiggyBack: 100 mL    Oral Fluid: 920 mL  Total IN: 1020 mL    OUT:    Voided: 600 mL  Total OUT: 600 mL    Total NET: 420 mL          MEDICATIONS  (STANDING):  piperacillin/tazobactam IVPB. 3.375 Gram(s) IV Intermittent every 8 hours  heparin  Injectable 5000 Unit(s) SubCutaneous every 8 hours  influenza   Vaccine 0.5 milliLiter(s) IntraMuscular once    MEDICATIONS  (PRN):  acetaminophen   Tablet 650 milliGRAM(s) Oral every 6 hours PRN For Temp greater than 38.5 C (101.3 F)  ibuprofen  Tablet 400 milliGRAM(s) Oral every 6 hours PRN Pain      LABS:                        12.7   8.7   )-----------( 195      ( 23 Sep 2017 07:08 )             38.3         141  |  107  |  7   ----------------------------<  119<H>  3.6   |  22  |  0.87    Ca    8.5      23 Sep 2017 07:07  Phos  2.3       Mg     1.9             Urinalysis Basic - ( 23 Sep 2017 15:02 )    Color: Yellow / Appearance: x / S.026 / pH: x  Gluc: x / Ketone: Negative  / Bili: Negative / Urobili: 1   Blood: x / Protein: 30 mg/dL / Nitrite: Negative   Leuk Esterase: Negative / RBC: 0-2 /HPF / WBC x   Sq Epi: x / Non Sq Epi: Few /HPF / Bacteria: x        RADIOLOGY & ADDITIONAL STUDIES:  No new studies

## 2017-09-24 NOTE — PROGRESS NOTE ADULT - ASSESSMENT
36YO female s/p laparoscopic appendectomy for uncomplicated appendicitis, POD 2, immediate post-op fever that since resolved, currently stable but c/o nausea     - post-op fever resolved, likely atelectasis - no acute findings on CXR and UA negative   - monitor vitals, patient currently afebrile   - C/w Zosyn   - nausea control w/ anti emetics   - DVT ppx  - Diet, Regular  - D/c to home on oral abx 5 days     [facility] [id number]

## 2017-09-25 VITALS
DIASTOLIC BLOOD PRESSURE: 78 MMHG | HEART RATE: 58 BPM | TEMPERATURE: 99 F | SYSTOLIC BLOOD PRESSURE: 126 MMHG | OXYGEN SATURATION: 97 % | RESPIRATION RATE: 18 BRPM

## 2017-09-25 LAB — SURGICAL PATHOLOGY STUDY: SIGNIFICANT CHANGE UP

## 2017-09-25 PROCEDURE — 71045 X-RAY EXAM CHEST 1 VIEW: CPT

## 2017-09-25 PROCEDURE — 74177 CT ABD & PELVIS W/CONTRAST: CPT

## 2017-09-25 PROCEDURE — 84702 CHORIONIC GONADOTROPIN TEST: CPT

## 2017-09-25 PROCEDURE — 81025 URINE PREGNANCY TEST: CPT

## 2017-09-25 PROCEDURE — 96375 TX/PRO/DX INJ NEW DRUG ADDON: CPT

## 2017-09-25 PROCEDURE — 83690 ASSAY OF LIPASE: CPT

## 2017-09-25 PROCEDURE — 80048 BASIC METABOLIC PNL TOTAL CA: CPT

## 2017-09-25 PROCEDURE — 81003 URINALYSIS AUTO W/O SCOPE: CPT

## 2017-09-25 PROCEDURE — 83735 ASSAY OF MAGNESIUM: CPT

## 2017-09-25 PROCEDURE — 81001 URINALYSIS AUTO W/SCOPE: CPT

## 2017-09-25 PROCEDURE — 74177 CT ABD & PELVIS W/CONTRAST: CPT | Mod: 26

## 2017-09-25 PROCEDURE — 80053 COMPREHEN METABOLIC PANEL: CPT

## 2017-09-25 PROCEDURE — 86850 RBC ANTIBODY SCREEN: CPT

## 2017-09-25 PROCEDURE — 86901 BLOOD TYPING SEROLOGIC RH(D): CPT

## 2017-09-25 PROCEDURE — 85027 COMPLETE CBC AUTOMATED: CPT

## 2017-09-25 PROCEDURE — 74018 RADEX ABDOMEN 1 VIEW: CPT

## 2017-09-25 PROCEDURE — 88304 TISSUE EXAM BY PATHOLOGIST: CPT

## 2017-09-25 PROCEDURE — 99285 EMERGENCY DEPT VISIT HI MDM: CPT | Mod: 25

## 2017-09-25 PROCEDURE — C1889: CPT

## 2017-09-25 PROCEDURE — 84100 ASSAY OF PHOSPHORUS: CPT

## 2017-09-25 PROCEDURE — 87086 URINE CULTURE/COLONY COUNT: CPT

## 2017-09-25 PROCEDURE — 86900 BLOOD TYPING SEROLOGIC ABO: CPT

## 2017-09-25 PROCEDURE — 96374 THER/PROPH/DIAG INJ IV PUSH: CPT | Mod: XU

## 2017-09-25 RX ORDER — DEXTROSE MONOHYDRATE, SODIUM CHLORIDE, AND POTASSIUM CHLORIDE 50; .745; 4.5 G/1000ML; G/1000ML; G/1000ML
1000 INJECTION, SOLUTION INTRAVENOUS
Qty: 0 | Refills: 0 | Status: DISCONTINUED | OUTPATIENT
Start: 2017-09-25 | End: 2017-09-25

## 2017-09-25 RX ADMIN — FAMOTIDINE 20 MILLIGRAM(S): 10 INJECTION INTRAVENOUS at 13:02

## 2017-09-25 NOTE — PROGRESS NOTE ADULT - ASSESSMENT
38YO female s/p laparoscopic appendectomy for uncomplicated appendicitis, POD 2, immediate post-op fever that since resolved, currently stable but c/o nausea     - Continued nausea, give anti-emetics  - monitor vitals, patient currently afebrile   - C/w Zosyn   - DVT ppx SQH  - Diet, Regular  - D/c to home on oral abx 5 days     [facility] [id number] 36YO female s/p laparoscopic appendectomy for uncomplicated appendicitis, POD 2, immediate post-op fever that since resolved, currently stable but c/o nausea     - Continued nausea, give anti-emetics  - monitor vitals, patient currently afebrile   - C/w Zosyn   - DVT ppx SQH  - Diet, Regular  - D/c to home on oral abx 7 days     [facility] [id number]

## 2017-09-25 NOTE — PROGRESS NOTE ADULT - SUBJECTIVE AND OBJECTIVE BOX
[facility] PROGRESS NOTE    SUBJECTIVE: Pt seen at bedside. AVSS. Denies vomiting. +/-. new epigastric burning pain. + nausea. + flatus, large amounts.    Vital Signs Last 24 Hrs  T(C): 36.9 (25 Sep 2017 05:00), Max: 37.6 (24 Sep 2017 06:13)  T(F): 98.4 (25 Sep 2017 05:00), Max: 99.6 (24 Sep 2017 06:13)  HR: 61 (25 Sep 2017 05:00) (61 - 80)  BP: 138/90 (25 Sep 2017 05:00) (130/76 - 138/90)  BP(mean): --  RR: 18 (25 Sep 2017 05:) (17 - 18)  SpO2: 97% (25 Sep 2017 05:) (94% - 99%)    Physical Exam  General: awake, alert,    Pulm: respirations unlabored, no increased WOB  Abdomen: soft, mildly distended, NT, incision c/d/i  Extremities: Grossly symmetric    I&O's Summary    23 Sep 2017 07:  -  24 Sep 2017 07:00  --------------------------------------------------------  IN: 1020 mL / OUT: 600 mL / NET: 420 mL    24 Sep 2017 07:  -  25 Sep 2017 05:44  --------------------------------------------------------  IN: 1040 mL / OUT: 200 mL / NET: 840 mL      I&O's Detail    23 Sep 2017 07:  -  24 Sep 2017 07:00  --------------------------------------------------------  IN:    IV PiggyBack: 100 mL    Oral Fluid: 920 mL  Total IN: 1020 mL    OUT:    Voided: 600 mL  Total OUT: 600 mL    Total NET: 420 mL      24 Sep 2017 07:01  -  25 Sep 2017 05:44  --------------------------------------------------------  IN:    IV PiggyBack: 200 mL    Oral Fluid: 840 mL  Total IN: 1040 mL    OUT:    Voided: 200 mL  Total OUT: 200 mL    Total NET: 840 mL          MEDICATIONS  (STANDING):  piperacillin/tazobactam IVPB. 3.375 Gram(s) IV Intermittent every 8 hours  heparin  Injectable 5000 Unit(s) SubCutaneous every 8 hours  influenza   Vaccine 0.5 milliLiter(s) IntraMuscular once  famotidine    Tablet 20 milliGRAM(s) Oral daily  oxyCODONE    IR 5 milliGRAM(s) Oral once    MEDICATIONS  (PRN):  acetaminophen   Tablet 650 milliGRAM(s) Oral every 6 hours PRN For Temp greater than 38.5 C (101.3 F)  ibuprofen  Tablet 400 milliGRAM(s) Oral every 6 hours PRN Pain      LABS:                        11.8   6.1   )-----------( 187      ( 24 Sep 2017 16:31 )             34.9         140  |  107  |  6<L>  ----------------------------<  103<H>  4.2   |  20<L>  |  0.69    Ca    8.6      24 Sep 2017 16:31  Phos  2.3       Mg     1.9         TPro  6.4  /  Alb  3.7  /  TBili  0.3  /  DBili  x   /  AST  45<H>  /  ALT  74<H>  /  AlkPhos  69        Urinalysis Basic - ( 23 Sep 2017 15:02 )    Color: Yellow / Appearance: x / S.026 / pH: x  Gluc: x / Ketone: Negative  / Bili: Negative / Urobili: 1   Blood: x / Protein: 30 mg/dL / Nitrite: Negative   Leuk Esterase: Negative / RBC: 0-2 /HPF / WBC x   Sq Epi: x / Non Sq Epi: Few /HPF / Bacteria: x        RADIOLOGY & ADDITIONAL STUDIES: [facility] PROGRESS NOTE    SUBJECTIVE: Pt seen at bedside. AVSS. Denies vomiting. New epigastric burning pain. + nausea. + flatus, large amounts.    Vital Signs Last 24 Hrs  T(C): 36.9 (25 Sep 2017 05:00), Max: 37.6 (24 Sep 2017 06:13)  T(F): 98.4 (25 Sep 2017 05:00), Max: 99.6 (24 Sep 2017 06:13)  HR: 61 (25 Sep 2017 05:) (61 - 80)  BP: 138/90 (25 Sep 2017 05:00) (130/76 - 138/90)  BP(mean): --  RR: 18 (25 Sep 2017 05:) (17 - 18)  SpO2: 97% (25 Sep 2017 05:) (94% - 99%)    Physical Exam  General: awake, alert,    Pulm: respirations unlabored, no increased WOB  Abdomen: soft, mildly distended, NT, incision c/d/i  Extremities: Grossly symmetric    I&O's Summary    23 Sep 2017 07:  -  24 Sep 2017 07:00  --------------------------------------------------------  IN: 1020 mL / OUT: 600 mL / NET: 420 mL    24 Sep 2017 07:  -  25 Sep 2017 05:44  --------------------------------------------------------  IN: 1040 mL / OUT: 200 mL / NET: 840 mL      I&O's Detail    23 Sep 2017 07:  -  24 Sep 2017 07:00  --------------------------------------------------------  IN:    IV PiggyBack: 100 mL    Oral Fluid: 920 mL  Total IN: 1020 mL    OUT:    Voided: 600 mL  Total OUT: 600 mL    Total NET: 420 mL      24 Sep 2017 07:01  -  25 Sep 2017 05:44  --------------------------------------------------------  IN:    IV PiggyBack: 200 mL    Oral Fluid: 840 mL  Total IN: 1040 mL    OUT:    Voided: 200 mL  Total OUT: 200 mL    Total NET: 840 mL          MEDICATIONS  (STANDING):  piperacillin/tazobactam IVPB. 3.375 Gram(s) IV Intermittent every 8 hours  heparin  Injectable 5000 Unit(s) SubCutaneous every 8 hours  influenza   Vaccine 0.5 milliLiter(s) IntraMuscular once  famotidine    Tablet 20 milliGRAM(s) Oral daily  oxyCODONE    IR 5 milliGRAM(s) Oral once    MEDICATIONS  (PRN):  acetaminophen   Tablet 650 milliGRAM(s) Oral every 6 hours PRN For Temp greater than 38.5 C (101.3 F)  ibuprofen  Tablet 400 milliGRAM(s) Oral every 6 hours PRN Pain      LABS:                        11.8   6.1   )-----------( 187      ( 24 Sep 2017 16:31 )             34.9     -24    140  |  107  |  6<L>  ----------------------------<  103<H>  4.2   |  20<L>  |  0.69    Ca    8.6      24 Sep 2017 16:31  Phos  2.3       Mg     1.9         TPro  6.4  /  Alb  3.7  /  TBili  0.3  /  DBili  x   /  AST  45<H>  /  ALT  74<H>  /  AlkPhos  69        Urinalysis Basic - ( 23 Sep 2017 15:02 )    Color: Yellow / Appearance: x / S.026 / pH: x  Gluc: x / Ketone: Negative  / Bili: Negative / Urobili: 1   Blood: x / Protein: 30 mg/dL / Nitrite: Negative   Leuk Esterase: Negative / RBC: 0-2 /HPF / WBC x   Sq Epi: x / Non Sq Epi: Few /HPF / Bacteria: x        RADIOLOGY & ADDITIONAL STUDIES:

## 2017-09-26 PROBLEM — K35.80 ACUTE APPENDICITIS, UNSPECIFIED ACUTE APPENDICITIS TYPE: Status: RESOLVED | Noted: 2017-09-26 | Resolved: 2017-09-26

## 2017-10-04 ENCOUNTER — APPOINTMENT (OUTPATIENT)
Dept: SURGERY | Facility: CLINIC | Age: 37
End: 2017-10-04
Payer: COMMERCIAL

## 2017-10-04 VITALS
HEIGHT: 64 IN | WEIGHT: 157 LBS | SYSTOLIC BLOOD PRESSURE: 143 MMHG | BODY MASS INDEX: 26.8 KG/M2 | DIASTOLIC BLOOD PRESSURE: 93 MMHG | TEMPERATURE: 98.7 F | OXYGEN SATURATION: 98 % | RESPIRATION RATE: 15 BRPM | HEART RATE: 71 BPM

## 2017-10-04 DIAGNOSIS — Z82.49 FAMILY HISTORY OF ISCHEMIC HEART DISEASE AND OTHER DISEASES OF THE CIRCULATORY SYSTEM: ICD-10-CM

## 2017-10-04 DIAGNOSIS — Z78.9 OTHER SPECIFIED HEALTH STATUS: ICD-10-CM

## 2017-10-04 DIAGNOSIS — Z80.3 FAMILY HISTORY OF MALIGNANT NEOPLASM OF BREAST: ICD-10-CM

## 2017-10-04 DIAGNOSIS — Z83.511 FAMILY HISTORY OF GLAUCOMA: ICD-10-CM

## 2017-10-04 DIAGNOSIS — Z87.59 PERSONAL HISTORY OF OTHER COMPLICATIONS OF PREGNANCY, CHILDBIRTH AND THE PUERPERIUM: ICD-10-CM

## 2017-10-04 PROCEDURE — 99024 POSTOP FOLLOW-UP VISIT: CPT

## 2017-10-04 RX ORDER — ONDANSETRON 4 MG/1
4 TABLET, ORALLY DISINTEGRATING ORAL
Qty: 30 | Refills: 0 | Status: DISCONTINUED | COMMUNITY
Start: 2017-09-26 | End: 2017-10-04

## 2017-11-20 ENCOUNTER — RESULT REVIEW (OUTPATIENT)
Age: 37
End: 2017-11-20

## 2017-11-30 ENCOUNTER — TRANSCRIPTION ENCOUNTER (OUTPATIENT)
Age: 37
End: 2017-11-30

## 2017-12-02 ENCOUNTER — APPOINTMENT (OUTPATIENT)
Dept: ULTRASOUND IMAGING | Facility: CLINIC | Age: 37
End: 2017-12-02
Payer: COMMERCIAL

## 2017-12-02 ENCOUNTER — OUTPATIENT (OUTPATIENT)
Dept: OUTPATIENT SERVICES | Facility: HOSPITAL | Age: 37
LOS: 1 days | End: 2017-12-02
Payer: COMMERCIAL

## 2017-12-02 DIAGNOSIS — R22.1 LOCALIZED SWELLING, MASS AND LUMP, NECK: ICD-10-CM

## 2017-12-02 PROCEDURE — 76536 US EXAM OF HEAD AND NECK: CPT | Mod: 26

## 2017-12-02 PROCEDURE — 76536 US EXAM OF HEAD AND NECK: CPT

## 2017-12-18 ENCOUNTER — OUTPATIENT (OUTPATIENT)
Dept: OUTPATIENT SERVICES | Facility: HOSPITAL | Age: 37
LOS: 1 days | End: 2017-12-18
Payer: COMMERCIAL

## 2017-12-18 ENCOUNTER — APPOINTMENT (OUTPATIENT)
Dept: ULTRASOUND IMAGING | Facility: CLINIC | Age: 37
End: 2017-12-18
Payer: COMMERCIAL

## 2017-12-18 DIAGNOSIS — N93.8 OTHER SPECIFIED ABNORMAL UTERINE AND VAGINAL BLEEDING: ICD-10-CM

## 2017-12-18 DIAGNOSIS — Z00.8 ENCOUNTER FOR OTHER GENERAL EXAMINATION: ICD-10-CM

## 2017-12-18 DIAGNOSIS — R92.2 INCONCLUSIVE MAMMOGRAM: ICD-10-CM

## 2017-12-18 PROCEDURE — 76856 US EXAM PELVIC COMPLETE: CPT

## 2017-12-18 PROCEDURE — 76641 ULTRASOUND BREAST COMPLETE: CPT | Mod: 26,50

## 2017-12-18 PROCEDURE — 76830 TRANSVAGINAL US NON-OB: CPT | Mod: 26

## 2017-12-18 PROCEDURE — 76641 ULTRASOUND BREAST COMPLETE: CPT

## 2017-12-18 PROCEDURE — 76830 TRANSVAGINAL US NON-OB: CPT

## 2017-12-18 PROCEDURE — 76856 US EXAM PELVIC COMPLETE: CPT | Mod: 26

## 2018-02-05 ENCOUNTER — APPOINTMENT (OUTPATIENT)
Dept: MRI IMAGING | Facility: CLINIC | Age: 38
End: 2018-02-05

## 2018-02-05 ENCOUNTER — OUTPATIENT (OUTPATIENT)
Dept: OUTPATIENT SERVICES | Facility: HOSPITAL | Age: 38
LOS: 1 days | End: 2018-02-05
Payer: COMMERCIAL

## 2018-02-05 DIAGNOSIS — Z00.8 ENCOUNTER FOR OTHER GENERAL EXAMINATION: ICD-10-CM

## 2018-02-05 PROCEDURE — 70544 MR ANGIOGRAPHY HEAD W/O DYE: CPT | Mod: 26,59

## 2018-02-05 PROCEDURE — 70551 MRI BRAIN STEM W/O DYE: CPT

## 2018-02-05 PROCEDURE — 70547 MR ANGIOGRAPHY NECK W/O DYE: CPT

## 2018-02-05 PROCEDURE — 70544 MR ANGIOGRAPHY HEAD W/O DYE: CPT

## 2018-02-05 PROCEDURE — 70547 MR ANGIOGRAPHY NECK W/O DYE: CPT | Mod: 26

## 2018-02-05 PROCEDURE — 70551 MRI BRAIN STEM W/O DYE: CPT | Mod: 26

## 2018-03-07 ENCOUNTER — RESULT REVIEW (OUTPATIENT)
Age: 38
End: 2018-03-07

## 2018-07-10 ENCOUNTER — TRANSCRIPTION ENCOUNTER (OUTPATIENT)
Age: 38
End: 2018-07-10

## 2018-09-28 ENCOUNTER — EMERGENCY (EMERGENCY)
Facility: HOSPITAL | Age: 38
LOS: 1 days | Discharge: HOME | End: 2018-09-28
Attending: EMERGENCY MEDICINE
Payer: COMMERCIAL

## 2018-09-28 VITALS
DIASTOLIC BLOOD PRESSURE: 99 MMHG | RESPIRATION RATE: 18 BRPM | HEIGHT: 64 IN | SYSTOLIC BLOOD PRESSURE: 148 MMHG | HEART RATE: 77 BPM | WEIGHT: 134.04 LBS | OXYGEN SATURATION: 99 % | TEMPERATURE: 98 F

## 2018-09-28 PROCEDURE — 99284 EMERGENCY DEPT VISIT MOD MDM: CPT

## 2018-09-28 PROCEDURE — 99282 EMERGENCY DEPT VISIT SF MDM: CPT

## 2018-09-28 NOTE — ED PROVIDER NOTE - OBJECTIVE STATEMENT
39 yo F with pmhx htn and migraines presenting with needle stick x today. 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 Patient states when closing the needle she pricked herself in the left thumb. patient irrigated the area after injury. Patient states her vaccines are up to date(last tetanus 2013). Patient denies history of hepatitis. Patient declined HIV prophylaxis and understands risks. Patient denies cp, sob, fever, tingling, numbness, weakness, abd pain, nvd, and erythema

## 2018-09-28 NOTE — ED PROVIDER NOTE - ATTENDING CONTRIBUTION TO CARE
38y F hx HTN, migraines here with needle stick injury. Pt works upstairs on OBGYN floor, stuck self L thumb with used heparin needle. Tdap UTD. Irrigated post injury. Pt HIV status known to be negative. Discussed that this is low risk injury, discussed HIV ppx which pt declines. Will FU with employee health. All post exposure bloods drawn and sent.

## 2018-09-28 NOTE — ED PROVIDER NOTE - CARE PLAN
Principal Discharge DX:	Needle stick injury of finger of left hand, initial encounter  Assessment and plan of treatment:	Follow up with employee health in three days. Return to the ER immediately for worsening symptoms

## 2018-09-28 NOTE — ED ADULT NURSE NOTE - OBJECTIVE STATEMENT
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 Not bleeding. Pt calm and cooperative. Denies pain Protocol done.

## 2018-09-28 NOTE — ED ADULT NURSE NOTE - NSIMPLEMENTINTERV_GEN_ALL_ED
Implemented All Universal Safety Interventions:  [place] to call system. Call bell, personal items and telephone within reach. Instruct patient to call for assistance. Room bathroom lighting operational. Non-slip footwear when patient is off stretcher. Physically safe environment: no spills, clutter or unnecessary equipment. Stretcher in lowest position, wheels locked, appropriate side rails in place.

## 2018-09-28 NOTE — ED PROVIDER NOTE - PROVIDER TOKENS
FREE:[LAST:[Health],PHONE:[(   )    -],FAX:[(   )    -],ADDRESS:[[facility] in three days]]

## 2018-09-28 NOTE — ED PROVIDER NOTE - SKIN, MLM
Skin normal color for race, warm, dry and No evidence of rash. small puncture wound to left anterior distal phalanx of the thumb

## 2018-09-28 NOTE — ED POST DISCHARGE NOTE - DETAILS
Attempted to call patient regarding source patient results as above. No answer, left voicemail with callback number. - Juan Varela PA-C Pt contacted and informed of nonreactive rapid HIV source patient result. Pt appreciative. Encouraged follow up with employee health this week. Pt aware. - Juan Varela PA-C

## 2019-01-13 ENCOUNTER — RESULT REVIEW (OUTPATIENT)
Age: 39
End: 2019-01-13

## 2019-02-01 ENCOUNTER — TRANSCRIPTION ENCOUNTER (OUTPATIENT)
Age: 39
End: 2019-02-01

## 2019-07-02 ENCOUNTER — TRANSCRIPTION ENCOUNTER (OUTPATIENT)
Age: 39
End: 2019-07-02

## 2019-07-02 ENCOUNTER — EMERGENCY (EMERGENCY)
Facility: HOSPITAL | Age: 39
LOS: 1 days | Discharge: HOME | End: 2019-07-02
Attending: EMERGENCY MEDICINE
Payer: COMMERCIAL

## 2019-07-02 VITALS
HEART RATE: 101 BPM | HEIGHT: 63.5 IN | SYSTOLIC BLOOD PRESSURE: 169 MMHG | OXYGEN SATURATION: 100 % | TEMPERATURE: 98 F | RESPIRATION RATE: 20 BRPM | WEIGHT: 139.99 LBS | DIASTOLIC BLOOD PRESSURE: 102 MMHG

## 2019-07-02 VITALS
TEMPERATURE: 98 F | RESPIRATION RATE: 18 BRPM | OXYGEN SATURATION: 100 % | HEART RATE: 83 BPM | SYSTOLIC BLOOD PRESSURE: 133 MMHG | DIASTOLIC BLOOD PRESSURE: 92 MMHG

## 2019-07-02 LAB
ANION GAP SERPL CALC-SCNC: 16 MMOL/L — SIGNIFICANT CHANGE UP (ref 5–17)
BASOPHILS # BLD AUTO: 0 K/UL — SIGNIFICANT CHANGE UP (ref 0–0.2)
BASOPHILS NFR BLD AUTO: 0.2 % — SIGNIFICANT CHANGE UP (ref 0–2)
BUN SERPL-MCNC: 12 MG/DL — SIGNIFICANT CHANGE UP (ref 7–23)
CALCIUM SERPL-MCNC: 9.5 MG/DL — SIGNIFICANT CHANGE UP (ref 8.4–10.5)
CHLORIDE SERPL-SCNC: 101 MMOL/L — SIGNIFICANT CHANGE UP (ref 96–108)
CO2 SERPL-SCNC: 21 MMOL/L — LOW (ref 22–31)
CREAT SERPL-MCNC: 0.66 MG/DL — SIGNIFICANT CHANGE UP (ref 0.5–1.3)
EOSINOPHIL # BLD AUTO: 0.1 K/UL — SIGNIFICANT CHANGE UP (ref 0–0.5)
EOSINOPHIL NFR BLD AUTO: 1.3 % — SIGNIFICANT CHANGE UP (ref 0–6)
ERYTHROCYTE [SEDIMENTATION RATE] IN BLOOD: 2 MM/HR — SIGNIFICANT CHANGE UP (ref 0–15)
GLUCOSE SERPL-MCNC: 108 MG/DL — HIGH (ref 70–99)
HCT VFR BLD CALC: 43.9 % — SIGNIFICANT CHANGE UP (ref 34.5–45)
HGB BLD-MCNC: 14.7 G/DL — SIGNIFICANT CHANGE UP (ref 11.5–15.5)
LYMPHOCYTES # BLD AUTO: 1.4 K/UL — SIGNIFICANT CHANGE UP (ref 1–3.3)
LYMPHOCYTES # BLD AUTO: 21.5 % — SIGNIFICANT CHANGE UP (ref 13–44)
MAGNESIUM SERPL-MCNC: 2 MG/DL — SIGNIFICANT CHANGE UP (ref 1.6–2.6)
MCHC RBC-ENTMCNC: 30.9 PG — SIGNIFICANT CHANGE UP (ref 27–34)
MCHC RBC-ENTMCNC: 33.6 GM/DL — SIGNIFICANT CHANGE UP (ref 32–36)
MCV RBC AUTO: 91.9 FL — SIGNIFICANT CHANGE UP (ref 80–100)
MONOCYTES # BLD AUTO: 0.6 K/UL — SIGNIFICANT CHANGE UP (ref 0–0.9)
MONOCYTES NFR BLD AUTO: 9.4 % — SIGNIFICANT CHANGE UP (ref 2–14)
NEUTROPHILS # BLD AUTO: 4.3 K/UL — SIGNIFICANT CHANGE UP (ref 1.8–7.4)
NEUTROPHILS NFR BLD AUTO: 67.6 % — SIGNIFICANT CHANGE UP (ref 43–77)
PHOSPHATE SERPL-MCNC: 2.4 MG/DL — LOW (ref 2.5–4.5)
PLATELET # BLD AUTO: 212 K/UL — SIGNIFICANT CHANGE UP (ref 150–400)
POTASSIUM SERPL-MCNC: 4 MMOL/L — SIGNIFICANT CHANGE UP (ref 3.5–5.3)
POTASSIUM SERPL-SCNC: 4 MMOL/L — SIGNIFICANT CHANGE UP (ref 3.5–5.3)
RBC # BLD: 4.77 M/UL — SIGNIFICANT CHANGE UP (ref 3.8–5.2)
RBC # FLD: 13.1 % — SIGNIFICANT CHANGE UP (ref 10.3–14.5)
SODIUM SERPL-SCNC: 138 MMOL/L — SIGNIFICANT CHANGE UP (ref 135–145)
WBC # BLD: 6.3 K/UL — SIGNIFICANT CHANGE UP (ref 3.8–10.5)
WBC # FLD AUTO: 6.3 K/UL — SIGNIFICANT CHANGE UP (ref 3.8–10.5)

## 2019-07-02 PROCEDURE — 85027 COMPLETE CBC AUTOMATED: CPT

## 2019-07-02 PROCEDURE — 70498 CT ANGIOGRAPHY NECK: CPT | Mod: 26

## 2019-07-02 PROCEDURE — 85652 RBC SED RATE AUTOMATED: CPT

## 2019-07-02 PROCEDURE — 99284 EMERGENCY DEPT VISIT MOD MDM: CPT

## 2019-07-02 PROCEDURE — 83735 ASSAY OF MAGNESIUM: CPT

## 2019-07-02 PROCEDURE — 70450 CT HEAD/BRAIN W/O DYE: CPT

## 2019-07-02 PROCEDURE — 70496 CT ANGIOGRAPHY HEAD: CPT | Mod: 26

## 2019-07-02 PROCEDURE — 70496 CT ANGIOGRAPHY HEAD: CPT

## 2019-07-02 PROCEDURE — 70498 CT ANGIOGRAPHY NECK: CPT

## 2019-07-02 PROCEDURE — 80048 BASIC METABOLIC PNL TOTAL CA: CPT

## 2019-07-02 PROCEDURE — 84100 ASSAY OF PHOSPHORUS: CPT

## 2019-07-02 RX ORDER — METOCLOPRAMIDE HCL 10 MG
10 TABLET ORAL ONCE
Refills: 0 | Status: COMPLETED | OUTPATIENT
Start: 2019-07-02 | End: 2019-07-02

## 2019-07-02 RX ORDER — SODIUM CHLORIDE 9 MG/ML
1000 INJECTION INTRAMUSCULAR; INTRAVENOUS; SUBCUTANEOUS ONCE
Refills: 0 | Status: COMPLETED | OUTPATIENT
Start: 2019-07-02 | End: 2019-07-02

## 2019-07-02 RX ADMIN — SODIUM CHLORIDE 1000 MILLILITER(S): 9 INJECTION INTRAMUSCULAR; INTRAVENOUS; SUBCUTANEOUS at 10:03

## 2019-07-02 RX ADMIN — Medication 2 TABLET(S): at 11:23

## 2019-07-02 NOTE — ED PROVIDER NOTE - PHYSICAL EXAMINATION
GEN: Well Appearing, Nontoxic, NAD  HEENT: NC/AT, Symm Facies.EOMI,   CV: +S1S2, RRR w/o m/g/r  RESP: CTAB w/o w/r/r  ABD: Soft, nt/nd, +BS. No guarding/rebound.   EXT/MSK: No lower extremity edema or calf tenderness. WWP, palpable pulses. FROMx4  SKIN: No erythema, lesions or rash  Neuro: Grossly intact, AOX3 with normal speech, Sensation intact, motor intact, normal finger to nose test Gait normal

## 2019-07-02 NOTE — ED PROVIDER NOTE - CLINICAL SUMMARY MEDICAL DECISION MAKING FREE TEXT BOX
Dr. Rose Note: new headache with ocular disturbance with reported and resolved weakness of both arms, eval for ICH vs venous clot vs aneursym.  But most likely migraine with aura.

## 2019-07-02 NOTE — ED PROVIDER NOTE - ATTENDING CONTRIBUTION TO CARE
Pt with h/o migraines (10yrs, neg MRI/MRA 1yr ago) and pre-eclampsia (resolved after 3yrs) presents with acute headache and b/l arm weakness with SBP 160s, weakness resolved. Also has R eye halo vision, worse in light, for several days.  Pt appears well, NAD, no focal neuro deficits currently.

## 2019-07-02 NOTE — ED ADULT TRIAGE NOTE - CHIEF COMPLAINT QUOTE
pt c/o symptomatic htn while working including sudden h/a associated with weakness and anxiety.  pt denies any cp at this time.

## 2019-07-02 NOTE — ED PROVIDER NOTE - OBJECTIVE STATEMENT
40 y/o female with PMH Migraines with aura presents to ED s/p headache this morning. Patient was working and asked her coworker to take her blood pressure, states that it was repeated multiple time and was high. She states then she felt a warm rush and she felt total body weakness thoughout. Patient states this does not feel like her normal migraine with aura.   Recently patient states that in her right eye she has a well circumcised black [place] highlighted by a yellow light, when she looks in the light. In the dark the image goes away. Patient has appointment with opthalmology tomorrow.   Patient recently started on Wellbutrin for smoking sensation. Patient works as a nurse.   Patient had HTN with first pregnancy, that lasted three years after for which she was treated for. Her second pregnancy she had gestational HTN that went away when she delivered her daughter.   Denies neck pain, SOB, chest pain, abdomianl pain, urinary symptoms, nausea, vomiting, fever, double vision, loss of vision.

## 2019-07-02 NOTE — ED PROVIDER NOTE - PROGRESS NOTE DETAILS
Patient states that headache has improved, CTA negative. Patient to follow up outpatient with PCP, NEURO, OPTHO. MARIANA Rose. - Aurora Gallegos PA-C Dr. Rose Note: pt with 3/10 headache,  now, no neuro deficit other than halo R eye (ongoing preceding headache).  Pt resistant to take any medications that I've offered for migraine, wants to go home and take med that neurologist prescribed but unable to remember name of medication, advised to f/u neurologist for further evaluation.

## 2019-07-02 NOTE — ED PROVIDER NOTE - NS ED ROS FT
Constitutional: No fever or chills  Eyes: +visual changes, No eye pain or redness  CV: No chest pain or lower extremity edema  Resp: No SOB no cough  GI: No abd pain. No nausea or vomiting. No diarrhea. No constipation.   : No dysuria, hematuria.   MSK: No musculoskeletal pain  Skin: No rash  Neuro: +headache. No numbness or tingling. +weakness.

## 2019-07-02 NOTE — ED ADULT NURSE NOTE - OBJECTIVE STATEMENT
39 year old female a/ox3 ambulatory with steady gait with pmh of migraines, HTN, (not currently being tx with medications), on Wellbutrin for smoking cessation presenting to ed with right eye vision changes x 1 week and  sudden onset of headache this morning. patient states she has noticed bright flashing light in her right lower vision. today pt was at work and noticed generalized dull constant headache that feels different from her usual migraines.

## 2019-07-02 NOTE — ED PROVIDER NOTE - NSFOLLOWUPINSTRUCTIONS_ED_ALL_ED_FT
1. Follow up with your Neurologist and Primary care provider in the next 2-3 days. Bring all of your discharge paper work with you.   2. Follow up with the eye specialist tomorrow as scheduled.   3. Take all at home medications as prescribed.  4. If you are having new or worsening symptoms such as, increase headache, weakness, fever, shortness of breath, vomiting come back to the ED.

## 2020-01-03 ENCOUNTER — TRANSCRIPTION ENCOUNTER (OUTPATIENT)
Age: 40
End: 2020-01-03

## 2020-01-28 ENCOUNTER — APPOINTMENT (OUTPATIENT)
Dept: MAMMOGRAPHY | Facility: CLINIC | Age: 40
End: 2020-01-28
Payer: COMMERCIAL

## 2020-01-28 ENCOUNTER — APPOINTMENT (OUTPATIENT)
Dept: ULTRASOUND IMAGING | Facility: CLINIC | Age: 40
End: 2020-01-28
Payer: COMMERCIAL

## 2020-01-28 ENCOUNTER — OUTPATIENT (OUTPATIENT)
Dept: OUTPATIENT SERVICES | Facility: HOSPITAL | Age: 40
LOS: 1 days | End: 2020-01-28
Payer: COMMERCIAL

## 2020-01-28 DIAGNOSIS — Z00.8 ENCOUNTER FOR OTHER GENERAL EXAMINATION: ICD-10-CM

## 2020-01-28 PROCEDURE — 77063 BREAST TOMOSYNTHESIS BI: CPT | Mod: 26

## 2020-01-28 PROCEDURE — 77063 BREAST TOMOSYNTHESIS BI: CPT

## 2020-01-28 PROCEDURE — 77067 SCR MAMMO BI INCL CAD: CPT

## 2020-01-28 PROCEDURE — 77067 SCR MAMMO BI INCL CAD: CPT | Mod: 26

## 2020-01-28 PROCEDURE — 76641 ULTRASOUND BREAST COMPLETE: CPT

## 2020-01-28 PROCEDURE — 76641 ULTRASOUND BREAST COMPLETE: CPT | Mod: 26,50

## 2020-01-29 ENCOUNTER — RESULT REVIEW (OUTPATIENT)
Age: 40
End: 2020-01-29

## 2020-03-14 ENCOUNTER — TRANSCRIPTION ENCOUNTER (OUTPATIENT)
Age: 40
End: 2020-03-14

## 2020-03-22 ENCOUNTER — TRANSCRIPTION ENCOUNTER (OUTPATIENT)
Age: 40
End: 2020-03-22

## 2020-04-25 ENCOUNTER — MESSAGE (OUTPATIENT)
Age: 40
End: 2020-04-25

## 2020-05-04 ENCOUNTER — LABORATORY RESULT (OUTPATIENT)
Age: 40
End: 2020-05-04

## 2020-05-04 ENCOUNTER — APPOINTMENT (OUTPATIENT)
Dept: PULMONOLOGY | Facility: CLINIC | Age: 40
End: 2020-05-04
Payer: COMMERCIAL

## 2020-05-04 VITALS
HEART RATE: 80 BPM | WEIGHT: 153 LBS | DIASTOLIC BLOOD PRESSURE: 70 MMHG | RESPIRATION RATE: 16 BRPM | BODY MASS INDEX: 26.12 KG/M2 | HEIGHT: 64 IN | TEMPERATURE: 98.1 F | OXYGEN SATURATION: 98 % | SYSTOLIC BLOOD PRESSURE: 110 MMHG

## 2020-05-04 LAB
25(OH)D3 SERPL-MCNC: 21.8 NG/ML
BASOPHILS # BLD AUTO: 0.08 K/UL
BASOPHILS NFR BLD AUTO: 1.2 %
CRP SERPL-MCNC: <0.1 MG/DL
EOSINOPHIL # BLD AUTO: 0.08 K/UL
EOSINOPHIL NFR BLD AUTO: 1.2 %
FERRITIN SERPL-MCNC: 25 NG/ML
HCT VFR BLD CALC: 40.8 %
HGB BLD-MCNC: 13.1 G/DL
IMM GRANULOCYTES NFR BLD AUTO: 0.3 %
LYMPHOCYTES # BLD AUTO: 2.11 K/UL
LYMPHOCYTES NFR BLD AUTO: 31.3 %
MAN DIFF?: NORMAL
MCHC RBC-ENTMCNC: 29.7 PG
MCHC RBC-ENTMCNC: 32.1 GM/DL
MCV RBC AUTO: 92.5 FL
MONOCYTES # BLD AUTO: 0.59 K/UL
MONOCYTES NFR BLD AUTO: 8.7 %
NEUTROPHILS # BLD AUTO: 3.87 K/UL
NEUTROPHILS NFR BLD AUTO: 57.3 %
PLATELET # BLD AUTO: 276 K/UL
RBC # BLD: 4.41 M/UL
RBC # FLD: 16.1 %
WBC # FLD AUTO: 6.75 K/UL

## 2020-05-04 PROCEDURE — 99204 OFFICE O/P NEW MOD 45 MIN: CPT | Mod: 25

## 2020-05-04 PROCEDURE — 71046 X-RAY EXAM CHEST 2 VIEWS: CPT

## 2020-05-04 PROCEDURE — 94618 PULMONARY STRESS TESTING: CPT

## 2020-05-04 NOTE — ASSESSMENT
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 Avoidance of cold air, extremes of temperature, rescue inhaler should be used before exercise. Order of medication reviewed with patient. Recommended use of a cool mist humidifier in the bedroom. \par \par Problem 2: Allergies/ Sinuses\par -Add Qnasl 1 sniff BID\par -get Blood work to include: asthma panel, food IgE panel, IgE level, eosinophil level, vitamin D level \par Environmental measures for allergies were encouraged including mattress and pillow covers, air purifier, and environmental controls. \par \par Problem 3: GERD\par -Add Pepcid 40 mg QHS \par -Rule of 2s: avoid eating too much, eating too late, eating too spicy, eating two hours before bed.\par -Things to avoid including overeating, spicy foods, tight clothing, eating within three hours of bed, this list is not all inclusive. \par -For treatment of reflux, possible options discussed including diet control, H2 blockers, PPIs, as well as coating motility agents discussed as treatment options. Timing of meals and proximity of last meal to sleep were discussed. If symptoms persist, a formal gastrointestinal evaluation is needed.\par \par Problem 4: Former Smoker\par -no need for LungCA screening-does not make criteria\par \par problem : poor breathing mechanics\par -Proper breathing techniques were reviewed with an emphasis of exhalation. Patient instructed to breath in for 1 second and out for four seconds. Patient was encouraged to not talk while walking. \par \par problem 5 : out of shape / overweight\par -Weight loss, exercise, and diet control were discussed and are highly encouraged. Treatment options were given such as, aqua therapy, and contacting a nutritionist. Recommended to use the elliptical, stationary bike, less use of treadmill. Mindful eating was explained to the patient Obesity is associated with worsening asthma, shortness of breath, and potential for cardiac disease, diabetes, and other underlying medical conditions\par \par problem 6: S/p COVID 19\par -Blood work for CRP, D-dimer, Ferritin \par baby aspirin for 1 month\par \par Immune Support Recommendations:\par -OTC Vitamin C 500mg BID \par -OTC Quercetin 250-500mg BID \par -OTC Zinc 75-100mg per day \par -OTC Melatonin 1or 2mg a night \par -OTC Vitamin D 1-4000mg per day \par -OTC Tonic Water 8oz per day\par \par problem 7: health maintenance \par -recommended yearly flu shot after October 15\par -recommended strep pneumonia vaccines: Prevnar-13 vaccine, followed by Pneumo vaccine 23 one year following after 65\par -recommended early intervention for Upper Respiratory Infections (URIs)\par -recommended regular osteoporosis evaluations\par -recommended early dermatological evaluations\par -recommended after the age of 50 to the age of 70, colonoscopy every 5 years\par \par F/U in 6-8 weeks.\par She is encouraged to call with any changes, concerns, or questions\par

## 2020-05-04 NOTE — HISTORY OF PRESENT ILLNESS
[TextBox_4] : Ms. TEJEDA is a 39 year old female presenting to the office today for initial pulmonary evaluation. Her chief complaint is\par \par -she initially presented with a fever\par -she felt fatigue, but it subsided\par -she notes SOB developed and she took off from work\par -she notes tightness and pressure in the chest occurring in 3 separate episodes\par -she notes frequent exercise use, walking and biking\par -she notes an energy level of 9/10\par -she notes positive COVID tests on March 19 and 20\par -she notes cough when lungs feel irritated\par -she notes increased reflux\par -she denies any snoring\par -she reports feeling rested upon awakening\par -she notes occluded sinuses\par -she denies PND\par -she notes long term itchy ears\par -she notes 15 pound gain since quitting smoking\par -she notes regular bowel movements \par \par -she denies any headaches, nausea, vomiting, chills, sweats, diarrhea, constipation, dysphagia, dizziness, leg swelling, leg pain, itchy eyes, sour taste in the mouth, myalgias or arthralgias.

## 2020-05-04 NOTE — PHYSICAL EXAM
[No Acute Distress] : no acute distress [Normal Oropharynx] : normal oropharynx [III] : Mallampati Class: III [Normal Appearance] : normal appearance [No Neck Mass] : no neck mass [Normal Rate/Rhythm] : normal rate/rhythm [Normal S1, S2] : normal s1, s2 [No Murmurs] : no murmurs [No Resp Distress] : no resp distress [No Abnormalities] : no abnormalities [Clear to Auscultation Bilaterally] : clear to auscultation bilaterally [Benign] : benign [Normal Gait] : normal gait [No Cyanosis] : no cyanosis [No Clubbing] : no clubbing [No Edema] : no edema [FROM] : FROM [Normal Color/ Pigmentation] : normal color/ pigmentation [No Focal Deficits] : no focal deficits [Oriented x3] : oriented x3 [Normal Affect] : normal affect [TextBox_68] : I:E ratio 1:3; clear

## 2020-05-04 NOTE — PROCEDURE
[FreeTextEntry1] : CXR reveals a normal sized heart; no evidence of infiltrate or effusion--a normal appearing chest radiograph\par  \par \par 6 minute walk test reveals a low saturation of  97% with no evidence of dyspnea or fatigue; walked 586.8 meters.

## 2020-05-04 NOTE — ADDENDUM
[FreeTextEntry1] : Documented by Сергей Trivedi acting as a scribe for Dr. Rafy Bond on 05/04/2020.\par \par All medical record entries made by the Scribe were at my, Dr. Rafy Bond's, direction and personally dictated by me on 05/04/2020 . I have reviewed the chart and agree that the record accurately reflects my personal performance of the history, physical exam, assessment and plan. I have also personally directed, reviewed, and agree with the discharge instructions. \par

## 2020-05-05 LAB
24R-OH-CALCIDIOL SERPL-MCNC: 46.1 PG/ML
DEPRECATED D DIMER PPP IA-ACNC: 228 NG/ML DDU

## 2020-05-06 LAB
A ALTERNATA IGE QN: <0.1 KUA/L
A FUMIGATUS IGE QN: <0.1 KUA/L
C ALBICANS IGE QN: <0.1 KUA/L
C HERBARUM IGE QN: <0.1 KUA/L
CAT DANDER IGE QN: <0.1 KUA/L
CLAM IGE QN: <0.1 KUA/L
CODFISH IGE QN: <0.1 KUA/L
COMMON RAGWEED IGE QN: <0.1 KUA/L
CORN IGE QN: <0.1 KUA/L
COW MILK IGE QN: 0.48 KUA/L
D FARINAE IGE QN: <0.1 KUA/L
D PTERONYSS IGE QN: <0.1 KUA/L
DEPRECATED A ALTERNATA IGE RAST QL: 0
DEPRECATED A FUMIGATUS IGE RAST QL: 0
DEPRECATED C ALBICANS IGE RAST QL: 0
DEPRECATED C HERBARUM IGE RAST QL: 0
DEPRECATED CAT DANDER IGE RAST QL: 0
DEPRECATED CLAM IGE RAST QL: 0
DEPRECATED CODFISH IGE RAST QL: 0
DEPRECATED COMMON RAGWEED IGE RAST QL: 0
DEPRECATED CORN IGE RAST QL: 0
DEPRECATED COW MILK IGE RAST QL: 1
DEPRECATED D FARINAE IGE RAST QL: 0
DEPRECATED D PTERONYSS IGE RAST QL: 0
DEPRECATED DOG DANDER IGE RAST QL: 0
DEPRECATED EGG WHITE IGE RAST QL: NORMAL
DEPRECATED M RACEMOSUS IGE RAST QL: 0
DEPRECATED PEANUT IGE RAST QL: 0
DEPRECATED ROACH IGE RAST QL: 0
DEPRECATED SCALLOP IGE RAST QL: <0.1 KUA/L
DEPRECATED SESAME SEED IGE RAST QL: 0
DEPRECATED SHRIMP IGE RAST QL: 0
DEPRECATED SOYBEAN IGE RAST QL: 0
DEPRECATED TIMOTHY IGE RAST QL: 0
DEPRECATED WALNUT IGE RAST QL: 0
DEPRECATED WHEAT IGE RAST QL: NORMAL
DEPRECATED WHITE OAK IGE RAST QL: 0
DOG DANDER IGE QN: <0.1 KUA/L
EGG WHITE IGE QN: 0.34 KUA/L
M RACEMOSUS IGE QN: <0.1 KUA/L
PEANUT IGE QN: <0.1 KUA/L
ROACH IGE QN: <0.1 KUA/L
SCALLOP IGE QN: 0
SCALLOP IGE QN: <0.1 KUA/L
SESAME SEED IGE QN: <0.1 KUA/L
SOYBEAN IGE QN: <0.1 KUA/L
TIMOTHY IGE QN: <0.1 KUA/L
TOTAL IGE SMQN RAST: 46 KU/L
WALNUT IGE QN: <0.1 KUA/L
WHEAT IGE QN: 0.12 KUA/L
WHITE OAK IGE QN: <0.1 KUA/L

## 2020-05-15 LAB
SARS-COV-2 IGG SERPL IA-ACNC: 2.5 INDEX
SARS-COV-2 IGG SERPL QL IA: POSITIVE

## 2020-07-13 ENCOUNTER — APPOINTMENT (OUTPATIENT)
Dept: PULMONOLOGY | Facility: CLINIC | Age: 40
End: 2020-07-13
Payer: COMMERCIAL

## 2020-07-13 VITALS
HEIGHT: 64 IN | TEMPERATURE: 98 F | RESPIRATION RATE: 16 BRPM | HEART RATE: 88 BPM | WEIGHT: 157 LBS | DIASTOLIC BLOOD PRESSURE: 70 MMHG | OXYGEN SATURATION: 98 % | SYSTOLIC BLOOD PRESSURE: 120 MMHG | BODY MASS INDEX: 26.8 KG/M2

## 2020-07-13 PROCEDURE — 99214 OFFICE O/P EST MOD 30 MIN: CPT

## 2020-07-13 NOTE — ASSESSMENT
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 \par \par Her shortness of breath is multifactorial due to:\par -poor mechanics of breathing \par -out of shape / overweight\par -pulmonary disease:\par - s/p Covid 19 Infection RADS/ bronchospasm\par -?cardiac disease (no evidence)\par \par problem 1: RADS/ bronchospasm (s/p COVID-19 infection)\par -continue Symbicort 160 2 puffs inhalations BID\par -Inhaler technique reviewed as well as oral hygiene techniques reviewed with patient. Avoidance of cold air, extremes of temperature, rescue inhaler should be used before exercise. Order of medication reviewed with patient. Recommended use of a cool mist humidifier in the bedroom. \par \par Problem 2: Allergies/ Sinuses\par -continue Qnasl 1 sniff BID\par -s/p Blood work to include: asthma panel, food IgE panel(+), IgE level(-), eosinophil level(-), vitamin D level \par Environmental measures for allergies were encouraged including mattress and pillow covers, air purifier, and environmental controls. \par \par Problem 2A: Low Vitamin D\par -Has been associated with asthma exacerbations and increased allergic symptoms. The goal based on recent information is maintaining levels between 50-70 and low normal is 30. Recommended 50,000 units every two weeks to once a month depending on the level. \par \par Problem 3: GERD\par -Add Pepcid 40 mg QHS \par -Rule of 2s: avoid eating too much, eating too late, eating too spicy, eating two hours before bed.\par -Things to avoid including overeating, spicy foods, tight clothing, eating within three hours of bed, this list is not all inclusive. \par -For treatment of reflux, possible options discussed including diet control, H2 blockers, PPIs, as well as coating motility agents discussed as treatment options. Timing of meals and proximity of last meal to sleep were discussed. If symptoms persist, a formal gastrointestinal evaluation is needed.\par \par Problem 4: Former Smoker\par -no need for LungCA screening-does not make criteria\par \par problem : poor breathing mechanics\par -Proper breathing techniques were reviewed with an emphasis of exhalation. Patient instructed to breath in for 1 second and out for four seconds. Patient was encouraged to not talk while walking. \par \par problem 5 : out of shape / overweight\par -Weight loss, exercise, and diet control were discussed and are highly encouraged. Treatment options were given such as, aqua therapy, and contacting a nutritionist. Recommended to use the elliptical, stationary bike, less use of treadmill. Mindful eating was explained to the patient Obesity is associated with worsening asthma, shortness of breath, and potential for cardiac disease, diabetes, and other underlying medical conditions\par \par problem 6: S/p COVID 19\par -s/p Blood work for CRP, D-dimer, Ferritin - WNL\par -s/p baby aspirin for 1 month\par \par Immune Support Recommendations:\par -OTC Vitamin C 500mg BID \par -OTC Quercetin 250-500mg BID \par -OTC Zinc 75-100mg per day \par -OTC Melatonin 1or 2mg a night \par -OTC Vitamin D 1-4000mg per day \par -OTC Tonic Water 8oz per day\par \par problem 7: health maintenance\par -Recommended to Stay Hydrated  \par -recommended yearly flu shot after October 15\par -recommended strep pneumonia vaccines: Prevnar-13 vaccine, followed by Pneumo vaccine 23 one year following after 65\par -recommended early intervention for Upper Respiratory Infections (URIs)\par -recommended regular osteoporosis evaluations\par -recommended early dermatological evaluations\par -recommended after the age of 50 to the age of 70, colonoscopy every 5 years\par \par F/U in 3-4 months\par She is encouraged to call with any changes, concerns, or questions\par

## 2020-07-13 NOTE — PHYSICAL EXAM
[Normal Oropharynx] : normal oropharynx [No Acute Distress] : no acute distress [II] : Mallampati Class: II [Normal Appearance] : normal appearance [No Neck Mass] : no neck mass [Normal Rate/Rhythm] : normal rate/rhythm [No Murmurs] : no murmurs [Normal S1, S2] : normal s1, s2 [Clear to Auscultation Bilaterally] : clear to auscultation bilaterally [No Resp Distress] : no resp distress [No Abnormalities] : no abnormalities [Benign] : benign [Normal Gait] : normal gait [No Cyanosis] : no cyanosis [No Clubbing] : no clubbing [Normal Color/ Pigmentation] : normal color/ pigmentation [FROM] : FROM [No Edema] : no edema [Oriented x3] : oriented x3 [No Focal Deficits] : no focal deficits [Normal Affect] : normal affect [TextBox_68] : I:E ratio 1:3; clear

## 2020-07-13 NOTE — ADDENDUM
[FreeTextEntry1] : Documented by Antony Jama acting as a scribe for Dr. Rafy Bond on 07/13/2020 \par \par All medical record entries made by the Scribe were at my, Dr. Rafy Bond's, direction and personally dictated by me on 07/13/2020 . I have reviewed the chart and agree that the record accurately reflects my personal performance of the history, physical exam, assessment and plan. I have also personally directed, reviewed, and agree with the discharge instructions.

## 2020-07-13 NOTE — HISTORY OF PRESENT ILLNESS
[TextBox_4] : Ms. TEJEDA is a 40 year old female presenting to the office today for f/u pulmonary evaluation. Her chief complaint is health maintenance. \par -she states that she has been feeling overall well. \par -she feels a chest pain/ back pain that is exacerbated when she puts on the mask. \par -she states that she uses an N95 when she has to see COVID-19 positive patients. \par -she reports that she has occasional heartburn. \par -she notes that she does not have palpitations. \par -she has been using the [employer] as a form of exercise (3-4 times a week). She does a  30 minute class. \par -she reports that she has eggs everyday as a part of her low diet. \par -she states that she has very vivid dreams that are either scary or very weird. \par -She reports that She is taking their prescribed medications regularly. \par -She reports that She is not using any new medication, vitamins, or supplements. \par \par -She denies any diarrhea, constipation, dysphagia, dizziness, sour taste in the mouth, leg swelling, leg pain, itchy eyes, itchy ears, reflux, myalgias or arthralgias.

## 2020-08-10 ENCOUNTER — RX RENEWAL (OUTPATIENT)
Age: 40
End: 2020-08-10

## 2020-09-11 ENCOUNTER — RX RENEWAL (OUTPATIENT)
Age: 40
End: 2020-09-11

## 2020-09-29 ENCOUNTER — RX RENEWAL (OUTPATIENT)
Age: 40
End: 2020-09-29

## 2020-10-13 LAB — SARS-COV-2 N GENE NPH QL NAA+PROBE: NOT DETECTED

## 2020-10-14 ENCOUNTER — APPOINTMENT (OUTPATIENT)
Dept: PULMONOLOGY | Facility: CLINIC | Age: 40
End: 2020-10-14
Payer: OTHER MISCELLANEOUS

## 2020-10-14 VITALS
BODY MASS INDEX: 26.29 KG/M2 | HEIGHT: 64 IN | WEIGHT: 154 LBS | SYSTOLIC BLOOD PRESSURE: 120 MMHG | HEART RATE: 76 BPM | OXYGEN SATURATION: 99 % | TEMPERATURE: 97.3 F | RESPIRATION RATE: 16 BRPM | DIASTOLIC BLOOD PRESSURE: 70 MMHG

## 2020-10-14 PROCEDURE — 94618 PULMONARY STRESS TESTING: CPT

## 2020-10-14 PROCEDURE — 94727 GAS DIL/WSHOT DETER LNG VOL: CPT

## 2020-10-14 PROCEDURE — 94729 DIFFUSING CAPACITY: CPT

## 2020-10-14 PROCEDURE — 94010 BREATHING CAPACITY TEST: CPT

## 2020-10-14 PROCEDURE — 95012 NITRIC OXIDE EXP GAS DETER: CPT

## 2020-10-14 PROCEDURE — 99214 OFFICE O/P EST MOD 30 MIN: CPT | Mod: 25

## 2020-10-14 NOTE — PHYSICAL EXAM
[No Acute Distress] : no acute distress [Normal Oropharynx] : normal oropharynx [Normal Appearance] : normal appearance [No Neck Mass] : no neck mass [Normal Rate/Rhythm] : normal rate/rhythm [Normal S1, S2] : normal s1, s2 [No Murmurs] : no murmurs [No Resp Distress] : no resp distress [Clear to Auscultation Bilaterally] : clear to auscultation bilaterally [No Abnormalities] : no abnormalities [No Clubbing] : no clubbing [Normal Gait] : normal gait [Benign] : benign [No Cyanosis] : no cyanosis [No Edema] : no edema [Normal Color/ Pigmentation] : normal color/ pigmentation [No Focal Deficits] : no focal deficits [FROM] : FROM [Oriented x3] : oriented x3 [Normal Affect] : normal affect [III] : Mallampati Class: III [TextBox_68] : I:E ratio 1:3; clear

## 2020-10-14 NOTE — ADDENDUM
[FreeTextEntry1] : Documented by Tomasa Rowley acting as a scribe for Dr. Rafy Bond on 10/14/2020 \par \par All medical record entries made by the Scribe were at my, Dr. Rafy Bond's, direction and personally dictated by me on 10/14/2020 . I have reviewed the chart and agree that the record accurately reflects my personal performance of the history, physical exam, assessment and plan. I have also personally directed, reviewed, and agree with the discharge instructions.

## 2020-10-14 NOTE — PROCEDURE
[FreeTextEntry1] : Full PFT reveals normal flows; FEV1 was 2.80L which is 100% of predicted; normal lung volumes; normal diffusion at 20.2, which is 88% of predicted; normal flow volume loop. \par \par 6 minute walk test reveals a low saturation of 97% with very slight dyspnea or fatigue; walked 782.4 meters.\par \par FENO was 12; a normal value being less than 25\par Fractional exhaled nitric oxide (FENO) is regarded as a simple, noninvasive method for assessing eosinophilic airway inflammation. Produced by a variety of cells within the lung, nitric oxide (NO) concentrations are generally low in healthy individuals. However, high concentrations of NO appear to be involved in nonspecific host defense mechanisms and chronic inflammatory diseases such as asthma. The American Thoracic Society (ATS) therefore has recommended using FENO to aid in the diagnosis and monitoring of eosinophilic airway inflammation and asthma, and for identifying steroid responsive individuals whose chronic respiratory symptoms may be caused by airway inflammation.

## 2020-10-14 NOTE — HISTORY OF PRESENT ILLNESS
[TextBox_4] : Ms. TEJEDA is a 40 year old female presenting to the office today for f/u pulmonary evaluation. Her chief complaint is\par - she notes shes been feeling some bronchospasms\par - when she takes a deep breath she feels a pain in her back. \par - She has not been exercising for a month, she has been feeling a little lazy. \par - she has been a bit stressed / nervous due to the COVID-19 pandemic. \par - she has been having a little bit of reflux\par - she has been taking Pepcid \par - she has not been taking Symbicort because it stopped being refilled. \par - she notes she has been experiencing some hair loss due to COVID. \par - she notes sshes a part of a COVID-19 antibody study, she got her AB test and her AB numbers went up. \par - She  denies any visual issues, headaches, nausea, vomiting, fever, chills, sweats, chest pains, chest pressure, diarrhea, constipation, dysphagia, myalgia, dizziness, leg swelling, leg pain, itchy eyes, itchy ears, heartburn, reflux, or sour taste in the mouth.\par

## 2020-10-19 ENCOUNTER — TRANSCRIPTION ENCOUNTER (OUTPATIENT)
Age: 40
End: 2020-10-19

## 2020-10-23 ENCOUNTER — RX RENEWAL (OUTPATIENT)
Age: 40
End: 2020-10-23

## 2020-11-04 ENCOUNTER — APPOINTMENT (OUTPATIENT)
Dept: CT IMAGING | Facility: CLINIC | Age: 40
End: 2020-11-04
Payer: COMMERCIAL

## 2020-11-04 ENCOUNTER — OUTPATIENT (OUTPATIENT)
Dept: OUTPATIENT SERVICES | Facility: HOSPITAL | Age: 40
LOS: 1 days | End: 2020-11-04
Payer: COMMERCIAL

## 2020-11-04 DIAGNOSIS — Z00.8 ENCOUNTER FOR OTHER GENERAL EXAMINATION: ICD-10-CM

## 2020-11-04 PROCEDURE — 70491 CT SOFT TISSUE NECK W/DYE: CPT

## 2020-11-04 PROCEDURE — 70491 CT SOFT TISSUE NECK W/DYE: CPT | Mod: 26

## 2020-11-09 ENCOUNTER — APPOINTMENT (OUTPATIENT)
Dept: ULTRASOUND IMAGING | Facility: CLINIC | Age: 40
End: 2020-11-09

## 2020-11-19 ENCOUNTER — APPOINTMENT (OUTPATIENT)
Dept: CARDIOLOGY | Facility: CLINIC | Age: 40
End: 2020-11-19
Payer: COMMERCIAL

## 2020-11-19 ENCOUNTER — APPOINTMENT (OUTPATIENT)
Dept: PULMONOLOGY | Facility: CLINIC | Age: 40
End: 2020-11-19
Payer: COMMERCIAL

## 2020-11-19 VITALS
HEIGHT: 64 IN | OXYGEN SATURATION: 100 % | DIASTOLIC BLOOD PRESSURE: 89 MMHG | HEART RATE: 92 BPM | BODY MASS INDEX: 26.8 KG/M2 | SYSTOLIC BLOOD PRESSURE: 134 MMHG | WEIGHT: 157 LBS

## 2020-11-19 VITALS
TEMPERATURE: 98 F | WEIGHT: 157 LBS | SYSTOLIC BLOOD PRESSURE: 110 MMHG | OXYGEN SATURATION: 99 % | BODY MASS INDEX: 26.8 KG/M2 | DIASTOLIC BLOOD PRESSURE: 80 MMHG | HEART RATE: 84 BPM | RESPIRATION RATE: 16 BRPM | HEIGHT: 64 IN

## 2020-11-19 PROCEDURE — 99205 OFFICE O/P NEW HI 60 MIN: CPT

## 2020-11-19 PROCEDURE — 99214 OFFICE O/P EST MOD 30 MIN: CPT

## 2020-11-19 PROCEDURE — 93000 ELECTROCARDIOGRAM COMPLETE: CPT

## 2020-11-19 NOTE — REVIEW OF SYSTEMS
[Negative] : Endocrine [Chest Discomfort] : no chest discomfort [GERD] : no gerd [Diarrhea] : no diarrhea [Constipation] : no constipation [Dysphagia] : no dysphagia

## 2020-11-19 NOTE — ASSESSMENT
[FreeTextEntry1] : Ms. 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 Her number one issue is neck fullness scare\par \par Her shortness of breath is multifactorial due to:\par -poor mechanics of breathing \par -out of shape / overweight\par -pulmonary disease:\par - s/p Covid 19 Infection RADS/ bronchospasm\par -?cardiac disease (no evidence)\par \par problem 1: RADS/ bronchospasm (s/p COVID-19 infection)\par -continue Symbicort 160 2 puffs inhalations BID (restart)\par -add Ventolin 2 puffs Q6H, pre-exercise \par -Inhaler technique reviewed as well as oral hygiene techniques reviewed with patient. Avoidance of cold air, extremes of temperature, rescue inhaler should be used before exercise. Order of medication reviewed with patient. Recommended use of a cool mist humidifier in the bedroom. \par \par Problem 2: Allergies/ Sinuses\par -continue Qnasl 1 sniff BID\par -s/p Blood work to include: asthma panel, food IgE panel(+), IgE level(-), eosinophil level(-), vitamin D level \par Environmental measures for allergies were encouraged including mattress and pillow covers, air purifier, and environmental controls. \par \par Problem 2A: Low Vitamin D\par -Has been associated with asthma exacerbations and increased allergic symptoms. The goal based on recent information is maintaining levels between 50-70 and low normal is 30. Recommended 50,000 units every two weeks to once a month depending on the level. \par \par Problem 3: GERD\par -Add Pepcid 40 mg QHS \par -Rule of 2s: avoid eating too much, eating too late, eating too spicy, eating two hours before bed.\par -Things to avoid including overeating, spicy foods, tight clothing, eating within three hours of bed, this list is not all inclusive. \par -For treatment of reflux, possible options discussed including diet control, H2 blockers, PPIs, as well as coating motility agents discussed as treatment options. Timing of meals and proximity of last meal to sleep were discussed. If symptoms persist, a formal gastrointestinal evaluation is needed.\par \par Problem 4: Former Smoker\par -no need for LungCA screening-does not make criteria\par \par problem : poor breathing mechanics\par -Proper breathing techniques were reviewed with an emphasis of exhalation. Patient instructed to breath in for 1 second and out for four seconds. Patient was encouraged to not talk while walking. \par \par problem 5 : out of shape / overweight\par -Weight loss, exercise, and diet control were discussed and are highly encouraged. Treatment options were given such as, aqua therapy, and contacting a nutritionist. Recommended to use the elliptical, stationary bike, less use of treadmill. Mindful eating was explained to the patient Obesity is associated with worsening asthma, shortness of breath, and potential for cardiac disease, diabetes, and other underlying medical conditions\par \par problem 6: S/p COVID 19\par -s/p Blood work for CRP, D-dimer, Ferritin - WNL\par -s/p baby aspirin for 1 month\par \par Immune Support Recommendations:\par -OTC Vitamin C 500mg BID \par -OTC Quercetin 250-500mg BID \par -OTC Zinc 75-100mg per day \par -OTC Melatonin 1or 2mg a night \par -OTC Vitamin D 1-4000mg per day \par -OTC Tonic Water 8oz per day\par \par Problem 7: Supraclavicular Fullness\par -Complete an MRI of the neck.\par \par problem 8: health maintenance\par -Recommended to Stay Hydrated  \par -recommended yearly flu shot - 10/2020\par -recommended strep pneumonia vaccines: Prevnar-13 vaccine, followed by Pneumo vaccine 23 one year following after 65\par -recommended early intervention for Upper Respiratory Infections (URIs)\par -recommended regular osteoporosis evaluations\par -recommended early dermatological evaluations\par -recommended after the age of 50 to the age of 70, colonoscopy every 5 years\par \par F/U in 3-4 months\par She is encouraged to call with any changes, concerns, or questions\par

## 2020-11-19 NOTE — ADDENDUM
[FreeTextEntry1] : Documented by Julito Bond acting as a scribe for Dr. Rafy Bond on 11/19/2020.\par \par All medical record entries made by the Scribe were at my, Dr. Rafy Bond's, direction and personally dictated by me on 11/19/2020. I have reviewed the chart and agree that the record accurately reflects my personal performance of the history, physical exam, assessment and plan. I have also personally directed, reviewed, and agree with the discharge instructions.

## 2020-11-19 NOTE — HISTORY OF PRESENT ILLNESS
[TextBox_4] : Ms. TEJEDA is a 40 year old female presenting to the office today for f/u pulmonary evaluation. Her chief complaint is neck fullness.\par -she first noticed an imbalance in the supraclavicular area, and saw her OBGYN for a breast check. Another OBGYN diagnosed her with a lipoma via U/S\par -she saw an ENT and had no results on neck CT / had a biopsy taken\par -she states this has occurred in the past (2017), but it seemed to disappear on its own\par -she notes the lipoma occasionally feels warmer than usual as it gets bigger throughout the day, but has no discomfort or beating sensation\par -she notes she got a head cold a few days after getting the flu shot - tested negative for Covid\par -she denies any chest pain, chest pressure, diarrhea, constipation, dysphagia, dizziness, sour taste in the mouth, leg swelling, leg pain, itchy eyes, itchy ears, heartburn, reflux, myalgias or arthralgias.

## 2020-11-19 NOTE — PHYSICAL EXAM
[No Acute Distress] : no acute distress [Normal Oropharynx] : normal oropharynx [Normal Appearance] : normal appearance [Normal Rate/Rhythm] : normal rate/rhythm [Normal S1, S2] : normal s1, s2 [No Murmurs] : no murmurs [No Resp Distress] : no resp distress [Clear to Auscultation Bilaterally] : clear to auscultation bilaterally [No Abnormalities] : no abnormalities [Benign] : benign [Normal Gait] : normal gait [No Clubbing] : no clubbing [No Cyanosis] : no cyanosis [No Edema] : no edema [FROM] : FROM [Normal Color/ Pigmentation] : normal color/ pigmentation [No Focal Deficits] : no focal deficits [Oriented x3] : oriented x3 [Normal Affect] : normal affect [II] : Mallampati Class: II [TextBox_44] : imbalance in the supraclavicular area [TextBox_68] : I:E ratio 1:3; clear

## 2020-11-19 NOTE — PHYSICAL EXAM
[General Appearance - Well Developed] : well developed [Normal Appearance] : normal appearance [Well Groomed] : well groomed [General Appearance - Well Nourished] : well nourished [No Deformities] : no deformities [General Appearance - In No Acute Distress] : no acute distress [Normal Conjunctiva] : the conjunctiva exhibited no abnormalities [Eyelids - No Xanthelasma] : the eyelids demonstrated no xanthelasmas [Normal Jugular Venous A Waves Present] : normal jugular venous A waves present [Normal Jugular Venous V Waves Present] : normal jugular venous V waves present [No Jugular Venous Gloria A Waves] : no jugular venous gloria A waves [Heart Rate And Rhythm] : heart rate and rhythm were normal [Heart Sounds] : normal S1 and S2 [Murmurs] : no murmurs present [Respiration, Rhythm And Depth] : normal respiratory rhythm and effort [Exaggerated Use Of Accessory Muscles For Inspiration] : no accessory muscle use [Auscultation Breath Sounds / Voice Sounds] : lungs were clear to auscultation bilaterally [Abdomen Soft] : soft [Abdomen Tenderness] : non-tender [Abdomen Mass (___ Cm)] : no abdominal mass palpated [Abnormal Walk] : normal gait [Gait - Sufficient For Exercise Testing] : the gait was sufficient for exercise testing [Nail Clubbing] : no clubbing of the fingernails [Cyanosis, Localized] : no localized cyanosis [Petechial Hemorrhages (___cm)] : no petechial hemorrhages [Skin Color & Pigmentation] : normal skin color and pigmentation [] : no rash [No Venous Stasis] : no venous stasis [Skin Lesions] : no skin lesions [No Skin Ulcers] : no skin ulcer [No Xanthoma] : no  xanthoma was observed [Oriented To Time, Place, And Person] : oriented to person, place, and time [Affect] : the affect was normal [Mood] : the mood was normal [No Anxiety] : not feeling anxious

## 2020-11-22 NOTE — DISCUSSION/SUMMARY
[FreeTextEntry1] : Patient is a 41 yo woman with PMH of HTN, preeclampsia 2008, migraines with aura presented to establish care in setting of her SOB/ONEILL\par \par 2008 - 1st pregnancy - preeclampsia\par 2013 - 2nd pregnancy - HTN prior to induction\par \par 2019 - occipital headaches - found to have elevated HTN - was started on lisinopril\par \par 3/2020 - covid infection with persistent SOB/ONEILL - has been improving - under the care of a pulmonologist\par - bp stable. Encouraged the patient to monitor blood pressure at home, keep a log, and report results back to us for evaluation. Based on results, we will adjust the regimen as necessary.\par - ECG with no acute ischemic changes (stable from prior)\par - routine blood work ordered\par - will refer for an echocardiogram with bubbles and an exercise stress test\par

## 2020-11-22 NOTE — HISTORY OF PRESENT ILLNESS
[FreeTextEntry1] : Patient is a 39 yo woman with PMH of HTN, preeclampsia 2008, migraines with aura presented to establish care in setting of her SOB/ONEILL\par \par 2008 - 1st pregnancy - preeclampsia\par 2013 - 2nd pregnancy - HTN prior to induction\par \par 2019 - occipital headaches - found to have elevated HTN - was started on lisinopril\par \par 3/2020 - covid infection with persistent SOB/ONEILL - has been improving - under the care of a pulmonologist

## 2020-11-23 ENCOUNTER — OUTPATIENT (OUTPATIENT)
Dept: OUTPATIENT SERVICES | Facility: HOSPITAL | Age: 40
LOS: 1 days | End: 2020-11-23
Payer: COMMERCIAL

## 2020-11-23 ENCOUNTER — RESULT REVIEW (OUTPATIENT)
Age: 40
End: 2020-11-23

## 2020-11-23 ENCOUNTER — APPOINTMENT (OUTPATIENT)
Dept: MRI IMAGING | Facility: CLINIC | Age: 40
End: 2020-11-23
Payer: COMMERCIAL

## 2020-11-23 DIAGNOSIS — R22.1 LOCALIZED SWELLING, MASS AND LUMP, NECK: ICD-10-CM

## 2020-11-23 PROCEDURE — 70543 MRI ORBT/FAC/NCK W/O &W/DYE: CPT

## 2020-11-23 PROCEDURE — A9585: CPT

## 2020-11-23 PROCEDURE — 70543 MRI ORBT/FAC/NCK W/O &W/DYE: CPT | Mod: 26

## 2020-12-04 ENCOUNTER — NON-APPOINTMENT (OUTPATIENT)
Age: 40
End: 2020-12-04

## 2020-12-16 ENCOUNTER — APPOINTMENT (OUTPATIENT)
Dept: THORACIC SURGERY | Facility: CLINIC | Age: 40
End: 2020-12-16
Payer: COMMERCIAL

## 2020-12-16 VITALS
OXYGEN SATURATION: 99 % | RESPIRATION RATE: 18 BRPM | SYSTOLIC BLOOD PRESSURE: 140 MMHG | WEIGHT: 157 LBS | DIASTOLIC BLOOD PRESSURE: 92 MMHG | HEIGHT: 64 IN | BODY MASS INDEX: 26.8 KG/M2 | HEART RATE: 83 BPM

## 2020-12-16 DIAGNOSIS — Z87.19 PERSONAL HISTORY OF OTHER DISEASES OF THE DIGESTIVE SYSTEM: ICD-10-CM

## 2020-12-16 PROCEDURE — 99072 ADDL SUPL MATRL&STAF TM PHE: CPT

## 2020-12-16 PROCEDURE — 99204 OFFICE O/P NEW MOD 45 MIN: CPT

## 2020-12-16 RX ORDER — BUDESONIDE AND FORMOTEROL FUMARATE DIHYDRATE 160; 4.5 UG/1; UG/1
160-4.5 AEROSOL RESPIRATORY (INHALATION) TWICE DAILY
Qty: 1 | Refills: 3 | Status: DISCONTINUED | COMMUNITY
Start: 2020-05-04 | End: 2020-12-16

## 2020-12-16 RX ORDER — BECLOMETHASONE DIPROPIONATE 80 UG/1
80 AEROSOL, METERED NASAL
Qty: 3 | Refills: 1 | Status: DISCONTINUED | COMMUNITY
Start: 2020-05-04 | End: 2020-12-16

## 2020-12-16 RX ORDER — ALBUTEROL SULFATE 90 UG/1
108 (90 BASE) AEROSOL, METERED RESPIRATORY (INHALATION) EVERY 6 HOURS
Qty: 1 | Refills: 1 | Status: COMPLETED | COMMUNITY
Start: 2020-10-14 | End: 2020-12-16

## 2020-12-18 NOTE — HISTORY OF PRESENT ILLNESS
[FreeTextEntry1] : Ms. Rubio is a 39 y/o female, former smoker (quit 9/11/19), with Hx of HTN, preeclampsia, migraines, GERD & COVID (3/10/20) who presents with random, idiopathic swelling/fullness of her right supraclavicular area.\par \par CT on 11/4/20: No cervical mass or adenopathy identified.\par \par MRI on 11/23/20: Normal exam. \par \par Patient is here today for CTSx consultation, referred by Dr. Rafy Bond. She reports this swelling & sensation of warmth in the right supraclavicular area that comes and goes and is more pronounced in the afternoon. She denies CP, SOB, headaches, dizziness, abdominal pain, nausea, vomiting, fevers, chills, joint/muscle pains, and weight loss. Also denies numbness, tingling, heaviness of her right arm.

## 2020-12-18 NOTE — REVIEW OF SYSTEMS
[Negative] : Psychiatric [Fever] : no fever [Chills] : no chills [Feeling Poorly] : not feeling poorly [Feeling Tired] : not feeling tired [Recent Weight Gain (___ Lbs)] : no recent weight gain [Recent Weight Loss (___ Lbs)] : no recent weight loss [FreeTextEntry9] : Right supraclavicular fossa fullness

## 2020-12-18 NOTE — PHYSICAL EXAM
[General Appearance - Alert] : alert [General Appearance - In No Acute Distress] : in no acute distress [General Appearance - Well Nourished] : well nourished [General Appearance - Well Developed] : well developed [General Appearance - Well-Appearing] : healthy appearing [Sclera] : the sclera and conjunctiva were normal [PERRL With Normal Accommodation] : pupils were equal in size, round, and reactive to light [Extraocular Movements] : extraocular movements were intact [Neck Appearance] : the appearance of the neck was normal [Neck Cervical Mass (___cm)] : no neck mass was observed [Jugular Venous Distention Increased] : there was no jugular-venous distention [] : no respiratory distress [Respiration, Rhythm And Depth] : normal respiratory rhythm and effort [Exaggerated Use Of Accessory Muscles For Inspiration] : no accessory muscle use [Auscultation Breath Sounds / Voice Sounds] : lungs were clear to auscultation bilaterally [Heart Rate And Rhythm] : heart rate was normal and rhythm regular [Heart Sounds] : normal S1 and S2 [Heart Sounds Gallop] : no gallops [Murmurs] : no murmurs [Heart Sounds Pericardial Friction Rub] : no pericardial rub [Examination Of The Chest] : the chest was normal in appearance [Chest Visual Inspection Thoracic Asymmetry] : no chest asymmetry [Bowel Sounds] : normal bowel sounds [Abdomen Soft] : soft [Abdomen Tenderness] : non-tender [Abdomen Mass (___ Cm)] : no abdominal mass palpated [Cervical Lymph Nodes Enlarged Posterior Bilaterally] : posterior cervical [Cervical Lymph Nodes Enlarged Anterior Bilaterally] : anterior cervical [Supraclavicular Lymph Nodes Enlarged Bilaterally] : supraclavicular [No CVA Tenderness] : no ~M costovertebral angle tenderness [Abnormal Walk] : normal gait [Nail Clubbing] : no clubbing  or cyanosis of the fingernails [Musculoskeletal - Swelling] : no joint swelling seen [Skin Color & Pigmentation] : normal skin color and pigmentation [Cranial Nerves] : cranial nerves 2-12 were intact [Sensation] : the sensory exam was normal to light touch and pinprick [Motor Exam] : the motor exam was normal [Oriented To Time, Place, And Person] : oriented to person, place, and time [Affect] : the affect was normal [Mood] : the mood was normal [FreeTextEntry1] : Slight right sided supraclavicular fullness, no appreciable masses, no tenderness, no skin erythema, no skin lesions

## 2020-12-18 NOTE — CONSULT LETTER
[Dear  ___] : Dear  [unfilled], [Consult Letter:] : I had the pleasure of evaluating your patient, [unfilled]. [Please see my note below.] : Please see my note below. [Consult Closing:] : Thank you very much for allowing me to participate in the care of this patient.  If you have any questions, please do not hesitate to contact me. [FreeTextEntry2] : Dr. Rafy Bond [FreeTextEntry3] : Clement Guzman MD, FACS \par , Division of Thoracic Surgery \par [facility] \par Chief, Thoracic Surgery \par [facility] \par Department of Cardiovascular & Thoracic Surgery \par  \par [facility] [employer] [facility]

## 2020-12-18 NOTE — ASSESSMENT
[FreeTextEntry1] : Ms. Rubio is a 39 y/o female, former smoker (quit 9/11/19), with Hx of HTN, preeclampsia, migraines, GERD & COVID (3/10/20) who presents with random, idiopathic swelling/fullness of her right supraclavicular area.\par \par CT on 11/4/20: No cervical mass or adenopathy identified.\par \par MRI on 11/23/20: Normal exam. \par \par Patient likely has larger right sided scalene fat pad. All exam maneuvers could not reproduce what patient is describing. No lung herniation appreciated.\par \par I have reviewed the patient's medical records and diagnostic images at time of this office consultation and have made the following recommendation:\par 1. Asked patient to photograph or video if this happens again and e-mail them to me.\par 2. Reassured patient it is nothing worrisome\par  \par I personally performed the services described in the documentation, reviewed the documentation recorded by the scribe in my presence and it accurately and completely records my words and actions.\par \par I, Сергей Navarrete PA-C, am scribing for and the presence of DARCY Gage, the following sections HISTORY OF PRESENT ILLNESSS, PAST MEDICAL/FAMILY/SOCIAL HISTORY; REVIEW OF SYSTEMS; VITAL SISNGS; PHYSICAL EXAM; DISPOSITION.

## 2020-12-23 ENCOUNTER — OUTPATIENT (OUTPATIENT)
Dept: OUTPATIENT SERVICES | Facility: HOSPITAL | Age: 40
LOS: 1 days | End: 2020-12-23
Payer: COMMERCIAL

## 2020-12-23 ENCOUNTER — APPOINTMENT (OUTPATIENT)
Dept: CV DIAGNOSTICS | Facility: HOSPITAL | Age: 40
End: 2020-12-23

## 2020-12-23 ENCOUNTER — APPOINTMENT (OUTPATIENT)
Dept: CV DIAGNOSITCS | Facility: HOSPITAL | Age: 40
End: 2020-12-23

## 2020-12-23 DIAGNOSIS — R06.02 SHORTNESS OF BREATH: ICD-10-CM

## 2020-12-23 PROCEDURE — 93306 TTE W/DOPPLER COMPLETE: CPT

## 2020-12-23 PROCEDURE — 93306 TTE W/DOPPLER COMPLETE: CPT | Mod: 26

## 2020-12-23 PROCEDURE — 93016 CV STRESS TEST SUPVJ ONLY: CPT

## 2020-12-23 PROCEDURE — 93018 CV STRESS TEST I&R ONLY: CPT

## 2020-12-23 PROCEDURE — 93017 CV STRESS TEST TRACING ONLY: CPT

## 2021-02-04 ENCOUNTER — TRANSCRIPTION ENCOUNTER (OUTPATIENT)
Age: 41
End: 2021-02-04

## 2021-02-09 LAB — SARS-COV-2 N GENE NPH QL NAA+PROBE: NOT DETECTED

## 2021-02-10 ENCOUNTER — APPOINTMENT (OUTPATIENT)
Dept: PULMONOLOGY | Facility: CLINIC | Age: 41
End: 2021-02-10
Payer: OTHER MISCELLANEOUS

## 2021-02-10 ENCOUNTER — NON-APPOINTMENT (OUTPATIENT)
Age: 41
End: 2021-02-10

## 2021-02-10 VITALS
RESPIRATION RATE: 16 BRPM | HEIGHT: 64 IN | HEART RATE: 71 BPM | DIASTOLIC BLOOD PRESSURE: 76 MMHG | SYSTOLIC BLOOD PRESSURE: 130 MMHG | BODY MASS INDEX: 26.12 KG/M2 | WEIGHT: 153 LBS | TEMPERATURE: 97.8 F | OXYGEN SATURATION: 99 %

## 2021-02-10 PROCEDURE — 94010 BREATHING CAPACITY TEST: CPT

## 2021-02-10 PROCEDURE — 95012 NITRIC OXIDE EXP GAS DETER: CPT

## 2021-02-10 PROCEDURE — ZZZZZ: CPT

## 2021-02-10 PROCEDURE — 94618 PULMONARY STRESS TESTING: CPT

## 2021-02-10 PROCEDURE — 99072 ADDL SUPL MATRL&STAF TM PHE: CPT

## 2021-02-10 PROCEDURE — 99214 OFFICE O/P EST MOD 30 MIN: CPT | Mod: 25

## 2021-02-10 NOTE — HISTORY OF PRESENT ILLNESS
[TextBox_4] : Ms. TEJEDA is a 40 year old female presenting to the office today for f/u pulmonary evaluation. Her chief complaint is weight. \par \par -she notes generally feeling well\par -she notes intermittent bronchospasm exacerbated by cold air 12/2020\par -she notes s/p COVID 19 vaccine 2 of 2\par -she notes exercising regularly\par -she notes adverse reaction to first COVID 19 injection, mild SOB, body aches, but tolerated second well\par -she notes heartburn and reflux controlled with Pepcid Rx\par -she notes drinks 1-2 cups of coffee a day\par -she denies visual issues\par -she denies ankle swelling\par -she notes weight improved, on diet [place]\par -she notes energy level increased\par -she notes sleep quality improved\par -she notes improved hydration\par -she notes episode of palpitations on poorly hydrated day\par -she notes regular bowel movements \par \par -denies any chest pain, chest pressure, diarrhea, constipation, dysphagia, sour taste in the mouth, dizziness, leg swelling, leg pain, myalgias, arthralgias, itchy eyes, itchy ears.\par \par

## 2021-02-10 NOTE — PHYSICAL EXAM
[No Acute Distress] : no acute distress [Normal Oropharynx] : normal oropharynx [Normal Appearance] : normal appearance [Normal Rate/Rhythm] : normal rate/rhythm [Normal S1, S2] : normal s1, s2 [No Murmurs] : no murmurs [No Resp Distress] : no resp distress [Clear to Auscultation Bilaterally] : clear to auscultation bilaterally [No Abnormalities] : no abnormalities [Benign] : benign [Normal Gait] : normal gait [No Clubbing] : no clubbing [No Cyanosis] : no cyanosis [No Edema] : no edema [FROM] : FROM [Normal Color/ Pigmentation] : normal color/ pigmentation [No Focal Deficits] : no focal deficits [Oriented x3] : oriented x3 [Normal Affect] : normal affect [III] : Mallampati Class: III [TextBox_44] : imbalance in the supraclavicular area [TextBox_68] : I:E ratio 1:3; clear

## 2021-02-10 NOTE — PROCEDURE
[FreeTextEntry1] : \par MR neck Soft Tissue (11.23.2020) reveals normal Exam. \par \par PFT revealed normal flows, with a FEV1 of 3.10 L, which is 103 % of predicted, normal lung volumes, and with a normal flow volume loop.\par \par 6 minute walk test reveals a low saturation of 94% with very slight evidence of dyspnea or fatigue; walked 586.8   meters\par  \par FENO was 13; a normal value being less than 25\par Fractional exhaled nitric oxide (FENO) is regarded as a simple, noninvasive method for assessing eosinophilic airway inflammation. Produced by a variety of cells within the lung, nitric oxide (NO) concentrations are generally low in healthy individuals. However, high concentrations of NO appear to be involved in nonspecific host defense mechanisms and chronic inflammatory diseases such as asthma. The American Thoracic Society (ATS) therefore has recommended using FENO to aid in the diagnosis and monitoring of eosinophilic airway inflammation and asthma, and for identifying steroid responsive individuals whose chronic respiratory symptoms may be caused by airway inflammation. \par \par -Images and procedures reviewed in detail and discussed with patient.

## 2021-02-10 NOTE — ADDENDUM
[FreeTextEntry1] : Documented by Сергей Trivedi acting as a scribe for Dr. Rafy Bodn on 02/10/2021.\par \par All medical record entries made by the Scribe were at my, Dr. Rafy Bond's, direction and personally dictated by me on 02/10/2021 . I have reviewed the chart and agree that the record accurately reflects my personal performance of the history, physical exam, assessment and plan. I have also personally directed, reviewed, and agree with the discharge instructions. \par

## 2021-02-10 NOTE — ASSESSMENT
[FreeTextEntry1] : Ms. 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 Her number one issue is intermittent SOB. \par Her shortness of breath is multifactorial due to:\par -poor mechanics of breathing \par -out of shape / overweight\par -pulmonary disease:\par - s/p Covid 19 Infection RADS/ bronchospasm\par -?cardiac disease (no evidence)\par \par problem 1: RADS/ bronchospasm (s/p COVID-19 infection) (Active)\par -continue Symbicort 160 2 puffs inhalations BID (restart)\par -continue Ventolin 2 puffs Q6H, pre-exercise \par Asthma is believed to be caused by inherited (genetic) and environmental factor, but its exact cause is unknown. Asthma may be triggered by allergens, lung infections, or irritants in the air. Asthma triggers are different for each person \par -Inhaler technique reviewed as well as oral hygiene techniques reviewed with patient. Avoidance of cold air, extremes of temperature, rescue inhaler should be used before exercise. Order of medication reviewed with patient. Recommended use of a cool mist humidifier in the bedroom. \par \par Problem 2: Allergies/ Sinuses\par -continue Qnasl 1 sniff BID\par -s/p Blood work to include: asthma panel, food IgE panel(+), IgE level(-), eosinophil level(-), vitamin D level \par Environmental measures for allergies were encouraged including mattress and pillow covers, air purifier, and environmental controls. \par \par Problem 2A: Low Vitamin D\par -Has been associated with asthma exacerbations and increased allergic symptoms. The goal based on recent information is maintaining levels between 50-70 and low normal is 30. Recommended 50,000 units every two weeks to once a month depending on the level. \par \par Problem 3: GERD\par -continue Pepcid 40 mg QHS \par -Rule of 2s: avoid eating too much, eating too late, eating too spicy, eating two hours before bed.\par -Things to avoid including overeating, spicy foods, tight clothing, eating within three hours of bed, this list is not all inclusive. \par -For treatment of reflux, possible options discussed including diet control, H2 blockers, PPIs, as well as coating motility agents discussed as treatment options. Timing of meals and proximity of last meal to sleep were discussed. If symptoms persist, a formal gastrointestinal evaluation is needed.\par \par Problem 4: Former Smoker\par -no need for Lung CA screening (does not make criteria)\par Discussed for five minutes with the patient the risks/associations with continued smoking including COPD, emphysema, shortness of breath, renal cancer, bladder cancer, stroke risk, cardiac disease, etc. Smoking cessation was discussed at length and highly encouraged. Various options to aid cessation was discussed including use of Chantix, Nicotrol, nicotine products, laser therapy, hypnosis, Wellbutrin, etc.\par \par problem 5: poor breathing mechanics\par -Proper breathing techniques were reviewed with an emphasis of exhalation. Patient instructed to breath in for 1 second and out for four seconds. Patient was encouraged to not talk while walking. \par \par problem 6 : out of shape / overweight (improved) \par -Weight loss, exercise, and diet control were discussed and are highly encouraged. Treatment options were given such as, aqua therapy, and contacting a nutritionist. Recommended to use the elliptical, stationary bike, less use of treadmill. Mindful eating was explained to the patient Obesity is associated with worsening asthma, shortness of breath, and potential for cardiac disease, diabetes, and other underlying medical conditions\par \par problem 7: S/p COVID 19\par -s/p Blood work for CRP, D-dimer, Ferritin - WNL\par -s/p baby aspirin for 1 month\par -s/p COVID 19 vaccination (Pfizer: 2 of 2)\par \par Immune Support Recommendations:\par -OTC Vitamin C 500mg BID \par -OTC Quercetin 250-500mg BID \par -OTC Zinc 75-100mg per day \par -OTC Melatonin 1or 2mg a night \par -OTC Vitamin D 1-4000mg per day \par -OTC Tonic Water 8oz per day\par \par Problem 8: Supraclavicular Fullness / Lipoma\par -s/p an MRI of the neck. (WNL) \par \par problem 9: health maintenance\par -Recommended to Stay Hydrated  \par -recommended yearly flu shot - 10/2020\par -recommended strep pneumonia vaccines: Prevnar-13 vaccine, followed by Pneumo vaccine 23 one year following after 65\par -recommended early intervention for Upper Respiratory Infections (URIs)\par -recommended regular osteoporosis evaluations\par -recommended early dermatological evaluations\par -recommended after the age of 50 to the age of 70, colonoscopy every 5 years\par \par F/U in 3-4 months\par She is encouraged to call with any changes, concerns, or questions\par

## 2021-02-23 NOTE — ED ADULT NURSE NOTE - BREATHING, MLM
Patient to ed with complaints of chest pain which started yesterday and worsened today.      Destini Belcher RN  02/23/21 7218 Spontaneous, unlabored and symmetrical

## 2021-03-03 ENCOUNTER — NON-APPOINTMENT (OUTPATIENT)
Age: 41
End: 2021-03-03

## 2021-03-04 NOTE — DISCHARGE NOTE ADULT - PATIENT PORTAL LINK FT
“You can access the Follow[facility] Patient Portal, offered by [facility], by registering with the following website: [email or web address]” DM (diabetes mellitus) Lactic acidosis

## 2021-03-05 ENCOUNTER — NON-APPOINTMENT (OUTPATIENT)
Age: 41
End: 2021-03-05

## 2021-03-10 ENCOUNTER — APPOINTMENT (OUTPATIENT)
Dept: INTERNAL MEDICINE | Facility: CLINIC | Age: 41
End: 2021-03-10
Payer: COMMERCIAL

## 2021-03-10 VITALS
HEIGHT: 64 IN | WEIGHT: 150 LBS | OXYGEN SATURATION: 99 % | DIASTOLIC BLOOD PRESSURE: 88 MMHG | SYSTOLIC BLOOD PRESSURE: 125 MMHG | HEART RATE: 74 BPM | RESPIRATION RATE: 16 BRPM | TEMPERATURE: 97.8 F | BODY MASS INDEX: 25.61 KG/M2

## 2021-03-10 DIAGNOSIS — Z82.49 FAMILY HISTORY OF ISCHEMIC HEART DISEASE AND OTHER DISEASES OF THE CIRCULATORY SYSTEM: ICD-10-CM

## 2021-03-10 DIAGNOSIS — Z87.898 PERSONAL HISTORY OF OTHER SPECIFIED CONDITIONS: ICD-10-CM

## 2021-03-10 DIAGNOSIS — G43.909 MIGRAINE, UNSPECIFIED, NOT INTRACTABLE, W/OUT STATUS MIGRAINOSUS: ICD-10-CM

## 2021-03-10 DIAGNOSIS — Z09 ENCOUNTER FOR FOLLOW-UP EXAMINATION AFTER COMPLETED TREATMENT FOR CONDITIONS OTHER THAN MALIGNANT NEOPLASM: ICD-10-CM

## 2021-03-10 PROCEDURE — 99386 PREV VISIT NEW AGE 40-64: CPT | Mod: 25

## 2021-03-10 PROCEDURE — 36415 COLL VENOUS BLD VENIPUNCTURE: CPT

## 2021-03-10 PROCEDURE — 99072 ADDL SUPL MATRL&STAF TM PHE: CPT

## 2021-03-10 NOTE — HISTORY OF PRESENT ILLNESS
[de-identified] : 40 year F with PMH HTN, GERD, Migraines, and COVID-19 presents for initial CPE. Pt denies CP/SOB, fever/chills, n/v/d/c.

## 2021-03-10 NOTE — HEALTH RISK ASSESSMENT
[YearQuit] : 2019 [Audit-CScore] : 3 [RCQ8Qdaep] : 0 [Change in mental status noted] : No change in mental status noted [High Risk Behavior] : no high risk behavior [MammogramDate] : 02/20 [PapSmearDate] : 02/20 [FreeTextEntry2] : RN

## 2021-03-10 NOTE — PLAN
[FreeTextEntry1] : Routine blood work and urine today. Pt advised to sign up for [facility] portal to review labs and communicate any questions or concerns directly. Yearly physical and return as needed for illness, medication refills, and new or existing complaints.

## 2021-03-11 ENCOUNTER — TRANSCRIPTION ENCOUNTER (OUTPATIENT)
Age: 41
End: 2021-03-11

## 2021-03-11 LAB
ALBUMIN SERPL ELPH-MCNC: 4.8 G/DL
ALP BLD-CCNC: 68 U/L
ALT SERPL-CCNC: 25 U/L
ANION GAP SERPL CALC-SCNC: 10 MMOL/L
APPEARANCE: ABNORMAL
AST SERPL-CCNC: 18 U/L
BACTERIA: ABNORMAL
BASOPHILS # BLD AUTO: 0.06 K/UL
BASOPHILS NFR BLD AUTO: 1 %
BILIRUB SERPL-MCNC: 0.3 MG/DL
BILIRUBIN URINE: NEGATIVE
BLOOD URINE: NEGATIVE
BUN SERPL-MCNC: 13 MG/DL
CALCIUM SERPL-MCNC: 9.3 MG/DL
CHLORIDE SERPL-SCNC: 105 MMOL/L
CHOLEST SERPL-MCNC: 189 MG/DL
CO2 SERPL-SCNC: 24 MMOL/L
COLOR: YELLOW
CREAT SERPL-MCNC: 0.64 MG/DL
EOSINOPHIL # BLD AUTO: 0.1 K/UL
EOSINOPHIL NFR BLD AUTO: 1.7 %
ESTIMATED AVERAGE GLUCOSE: 111 MG/DL
GLUCOSE QUALITATIVE U: NEGATIVE
GLUCOSE SERPL-MCNC: 106 MG/DL
HBA1C MFR BLD HPLC: 5.5 %
HCT VFR BLD CALC: 43.1 %
HDLC SERPL-MCNC: 60 MG/DL
HGB BLD-MCNC: 13.5 G/DL
HIV1+2 AB SPEC QL IA.RAPID: NONREACTIVE
HYALINE CASTS: 2 /LPF
IMM GRANULOCYTES NFR BLD AUTO: 0.2 %
KETONES URINE: NEGATIVE
LDLC SERPL CALC-MCNC: 117 MG/DL
LEUKOCYTE ESTERASE URINE: NEGATIVE
LYMPHOCYTES # BLD AUTO: 1.86 K/UL
LYMPHOCYTES NFR BLD AUTO: 30.7 %
MAN DIFF?: NORMAL
MCHC RBC-ENTMCNC: 28.2 PG
MCHC RBC-ENTMCNC: 31.3 GM/DL
MCV RBC AUTO: 90.2 FL
MICROSCOPIC-UA: NORMAL
MONOCYTES # BLD AUTO: 0.51 K/UL
MONOCYTES NFR BLD AUTO: 8.4 %
NEUTROPHILS # BLD AUTO: 3.52 K/UL
NEUTROPHILS NFR BLD AUTO: 58 %
NITRITE URINE: NEGATIVE
NONHDLC SERPL-MCNC: 130 MG/DL
PH URINE: 6.5
PLATELET # BLD AUTO: 289 K/UL
POTASSIUM SERPL-SCNC: 4.7 MMOL/L
PROT SERPL-MCNC: 7.2 G/DL
PROTEIN URINE: NEGATIVE
RBC # BLD: 4.78 M/UL
RBC # FLD: 15.3 %
RED BLOOD CELLS URINE: 5 /HPF
SODIUM SERPL-SCNC: 139 MMOL/L
SPECIFIC GRAVITY URINE: 1.02
SQUAMOUS EPITHELIAL CELLS: 14 /HPF
TRIGL SERPL-MCNC: 64 MG/DL
TSH SERPL-ACNC: 0.87 UIU/ML
UROBILINOGEN URINE: NORMAL
WBC # FLD AUTO: 6.06 K/UL
WHITE BLOOD CELLS URINE: 4 /HPF

## 2021-04-16 ENCOUNTER — APPOINTMENT (OUTPATIENT)
Dept: MAMMOGRAPHY | Facility: CLINIC | Age: 41
End: 2021-04-16
Payer: COMMERCIAL

## 2021-04-16 ENCOUNTER — OUTPATIENT (OUTPATIENT)
Dept: OUTPATIENT SERVICES | Facility: HOSPITAL | Age: 41
LOS: 1 days | End: 2021-04-16
Payer: COMMERCIAL

## 2021-04-16 ENCOUNTER — APPOINTMENT (OUTPATIENT)
Dept: ULTRASOUND IMAGING | Facility: CLINIC | Age: 41
End: 2021-04-16
Payer: COMMERCIAL

## 2021-04-16 DIAGNOSIS — Z00.8 ENCOUNTER FOR OTHER GENERAL EXAMINATION: ICD-10-CM

## 2021-04-16 PROCEDURE — 77067 SCR MAMMO BI INCL CAD: CPT | Mod: 26

## 2021-04-16 PROCEDURE — 77063 BREAST TOMOSYNTHESIS BI: CPT | Mod: 26

## 2021-04-16 PROCEDURE — 77067 SCR MAMMO BI INCL CAD: CPT

## 2021-04-16 PROCEDURE — 76641 ULTRASOUND BREAST COMPLETE: CPT | Mod: 26,50

## 2021-04-16 PROCEDURE — 76641 ULTRASOUND BREAST COMPLETE: CPT

## 2021-04-16 PROCEDURE — 77063 BREAST TOMOSYNTHESIS BI: CPT

## 2021-04-20 ENCOUNTER — RESULT REVIEW (OUTPATIENT)
Age: 41
End: 2021-04-20

## 2021-05-04 ENCOUNTER — OUTPATIENT (OUTPATIENT)
Dept: OUTPATIENT SERVICES | Facility: HOSPITAL | Age: 41
LOS: 1 days | End: 2021-05-04
Payer: COMMERCIAL

## 2021-05-04 ENCOUNTER — APPOINTMENT (OUTPATIENT)
Dept: ULTRASOUND IMAGING | Facility: CLINIC | Age: 41
End: 2021-05-04
Payer: COMMERCIAL

## 2021-05-04 DIAGNOSIS — Z00.8 ENCOUNTER FOR OTHER GENERAL EXAMINATION: ICD-10-CM

## 2021-05-04 PROCEDURE — 76830 TRANSVAGINAL US NON-OB: CPT

## 2021-05-04 PROCEDURE — 76856 US EXAM PELVIC COMPLETE: CPT

## 2021-05-04 PROCEDURE — 76830 TRANSVAGINAL US NON-OB: CPT | Mod: 26

## 2021-05-04 PROCEDURE — 76856 US EXAM PELVIC COMPLETE: CPT | Mod: 26

## 2021-05-13 ENCOUNTER — RX RENEWAL (OUTPATIENT)
Age: 41
End: 2021-05-13

## 2021-07-06 ENCOUNTER — NON-APPOINTMENT (OUTPATIENT)
Age: 41
End: 2021-07-06

## 2021-07-06 ENCOUNTER — APPOINTMENT (OUTPATIENT)
Dept: PULMONOLOGY | Facility: CLINIC | Age: 41
End: 2021-07-06
Payer: OTHER MISCELLANEOUS

## 2021-07-06 VITALS
DIASTOLIC BLOOD PRESSURE: 64 MMHG | HEART RATE: 83 BPM | OXYGEN SATURATION: 99 % | SYSTOLIC BLOOD PRESSURE: 110 MMHG | RESPIRATION RATE: 16 BRPM | HEIGHT: 64 IN | BODY MASS INDEX: 25.95 KG/M2 | TEMPERATURE: 97.4 F | WEIGHT: 152 LBS

## 2021-07-06 PROCEDURE — 99072 ADDL SUPL MATRL&STAF TM PHE: CPT

## 2021-07-06 PROCEDURE — 99214 OFFICE O/P EST MOD 30 MIN: CPT | Mod: 25

## 2021-07-06 PROCEDURE — 95012 NITRIC OXIDE EXP GAS DETER: CPT

## 2021-07-06 PROCEDURE — 94010 BREATHING CAPACITY TEST: CPT

## 2021-07-06 NOTE — PROCEDURE
[FreeTextEntry1] : PFT revealed normal flows, with a FEV1 of 2.98 L, which is 100 % of predicted, normal lung volumes, and with a normal flow volume loop.\par \par FENO was 14; a normal value being less than 25\par Fractional exhaled nitric oxide (FENO) is regarded as a simple, noninvasive method for assessing eosinophilic airway inflammation. Produced by a variety of cells within the lung, nitric oxide (NO) concentrations are generally low in healthy individuals. However, high concentrations of NO appear to be involved in nonspecific host defense mechanisms and chronic inflammatory diseases such as asthma. The American Thoracic Society (ATS) therefore has recommended using FENO to aid in the diagnosis and monitoring of eosinophilic airway inflammation and asthma, and for identifying steroid responsive individuals whose chronic respiratory symptoms may be caused by airway inflammation.

## 2021-07-06 NOTE — PHYSICAL EXAM
[No Acute Distress] : no acute distress [Normal Oropharynx] : normal oropharynx [II] : Mallampati Class: II [Normal Appearance] : normal appearance [Normal Rate/Rhythm] : normal rate/rhythm [Normal S1, S2] : normal s1, s2 [No Murmurs] : no murmurs [No Resp Distress] : no resp distress [Clear to Auscultation Bilaterally] : clear to auscultation bilaterally [No Abnormalities] : no abnormalities [Benign] : benign [Normal Gait] : normal gait [No Clubbing] : no clubbing [No Cyanosis] : no cyanosis [No Edema] : no edema [FROM] : FROM [Normal Color/ Pigmentation] : normal color/ pigmentation [No Focal Deficits] : no focal deficits [Oriented x3] : oriented x3 [Normal Affect] : normal affect [TextBox_68] : I:E ratio 1:3; clear

## 2021-07-06 NOTE — ASSESSMENT
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 Her number one issue is intermittent SOB. (improved; s/p COVID 19 vaccine). \par \par Her shortness of breath is multifactorial due to:\par -poor mechanics of breathing \par -out of shape / overweight\par -pulmonary disease:\par - s/p Covid 19 Infection RADS/ bronchospasm\par -?cardiac disease (no evidence)\par \par problem 1: RADS/ bronchospasm (s/p COVID-19 infection) (controlled) \par -continue Symbicort 160 2 puffs inhalations BID (restart)\par -continue Ventolin 2 puffs Q6H, pre-exercise \par Asthma is believed to be caused by inherited (genetic) and environmental factor, but its exact cause is unknown. Asthma may be triggered by allergens, lung infections, or irritants in the air. Asthma triggers are different for each person \par -Inhaler technique reviewed as well as oral hygiene techniques reviewed with patient. Avoidance of cold air, extremes of temperature, rescue inhaler should be used before exercise. Order of medication reviewed with patient. Recommended use of a cool mist humidifier in the bedroom. \par \par Problem 2: Allergies/ Sinuses\par -continue Qnasl 1 sniff BID\par -s/p Blood work to include: asthma panel, food IgE panel(+), IgE level(-), eosinophil level(-), vitamin D level \par Environmental measures for allergies were encouraged including mattress and pillow covers, air purifier, and environmental controls. \par \par Problem 2A: Low Vitamin D\par -Has been associated with asthma exacerbations and increased allergic symptoms. The goal based on recent information is maintaining levels between 50-70 and low normal is 30. Recommended 50,000 units every two weeks to once a month depending on the level. \par \par Problem 3: GERD\par -continue Pepcid 40 mg QHS \par -Rule of 2s: avoid eating too much, eating too late, eating too spicy, eating two hours before bed.\par -Things to avoid including overeating, spicy foods, tight clothing, eating within three hours of bed, this list is not all inclusive. \par -For treatment of reflux, possible options discussed including diet control, H2 blockers, PPIs, as well as coating motility agents discussed as treatment options. Timing of meals and proximity of last meal to sleep were discussed. If symptoms persist, a formal gastrointestinal evaluation is needed.\par \par Problem 4: Former Smoker\par -no need for Lung CA screening (does not make criteria)\par Discussed for five minutes with the patient the risks/associations with continued smoking including COPD, emphysema, shortness of breath, renal cancer, bladder cancer, stroke risk, cardiac disease, etc. Smoking cessation was discussed at length and highly encouraged. Various options to aid cessation was discussed including use of Chantix, Nicotrol, nicotine products, laser therapy, hypnosis, Wellbutrin, etc.\par \par problem 5: poor breathing mechanics\par -Proper breathing techniques were reviewed with an emphasis of exhalation. Patient instructed to breath in for 1 second and out for four seconds. Patient was encouraged to not talk while walking. \par \par problem 6 : out of shape / overweight (improved) \par -Weight loss, exercise, and diet control were discussed and are highly encouraged. Treatment options were given such as, aqua therapy, and contacting a nutritionist. Recommended to use the elliptical, stationary bike, less use of treadmill. Mindful eating was explained to the patient Obesity is associated with worsening asthma, shortness of breath, and potential for cardiac disease, diabetes, and other underlying medical conditions\par \par problem 7: S/p COVID 19\par -s/p Pfizer COVID 19 vaccine\par -s/p Blood work for CRP, D-dimer, Ferritin - WNL\par -s/p baby aspirin for 1 month\par -s/p COVID 19 vaccination (Pfizer: 2 of 2)\par \par Immune Support Recommendations:\par -OTC Vitamin C 500mg BID \par -OTC Quercetin 250-500mg BID \par -OTC Zinc 75-100mg per day \par -OTC Melatonin 1or 2mg a night \par -OTC Vitamin D 1-4000mg per day \par -OTC Tonic Water 8oz per day\par \par Problem 8: Supraclavicular Fullness / Lipoma\par -s/p an MRI of the neck. (WNL) \par \par problem 9: health maintenance\par -Recommended to Stay Hydrated  \par -recommended yearly flu shot - 10/2020\par -recommended strep pneumonia vaccines: Prevnar-13 vaccine, followed by Pneumo vaccine 23 one year following after 65\par -recommended early intervention for Upper Respiratory Infections (URIs)\par -recommended regular osteoporosis evaluations\par -recommended early dermatological evaluations\par -recommended after the age of 50 to the age of 70, colonoscopy every 5 years\par \par F/U in 3-4 months\par She is encouraged to call with any changes, concerns, or questions\par

## 2021-07-06 NOTE — ADDENDUM
[FreeTextEntry1] : Documented by Сергей Trivedi acting as a scribe for Dr. Rafy Bond on 07/06/2021.\par \par All medical record entries made by the Scribe were at my, Dr. Rafy Bond's, direction and personally dictated by me on 07/06/2021 . I have reviewed the chart and agree that the record accurately reflects my personal performance of the history, physical exam, assessment and plan. I have also personally directed, reviewed, and agree with the discharge instructions. \par

## 2021-07-06 NOTE — HISTORY OF PRESENT ILLNESS
[TextBox_4] : Ms. TEJEDA is a 41 year old female presenting to the office today for f/u pulmonary evaluation. Her chief complaint is \par \par -she notes generally feeling improved\par -she notes breathing improved compared to in the winter\par -she notes exersize hiking tolerated well\par .bow\par -she notes improved hydration\par -she notes heartburn controlled with Pepcid\par -she notes sleep quality fluctuates\par -she notes intermittent issues staying asleep\par \par \par -denies any headaches, nausea, vomiting, fever, chills, sweats, chest pain, chest pressure, diarrhea, constipation, dysphagia, sour taste in the mouth, dizziness, leg swelling, leg pain, myalgias, arthralgias, itchy eyes, itchy ears, heartburn, or reflux.\par \par

## 2021-07-08 ENCOUNTER — APPOINTMENT (OUTPATIENT)
Dept: PSYCHIATRY | Facility: CLINIC | Age: 41
End: 2021-07-08
Payer: COMMERCIAL

## 2021-07-08 PROCEDURE — 90791 PSYCH DIAGNOSTIC EVALUATION: CPT | Mod: 95

## 2021-07-14 ENCOUNTER — APPOINTMENT (OUTPATIENT)
Dept: PSYCHIATRY | Facility: CLINIC | Age: 41
End: 2021-07-14
Payer: COMMERCIAL

## 2021-07-14 PROCEDURE — 90837 PSYTX W PT 60 MINUTES: CPT | Mod: 95

## 2021-08-04 ENCOUNTER — APPOINTMENT (OUTPATIENT)
Dept: PSYCHIATRY | Facility: CLINIC | Age: 41
End: 2021-08-04
Payer: COMMERCIAL

## 2021-08-04 PROCEDURE — 90834 PSYTX W PT 45 MINUTES: CPT | Mod: 95

## 2021-08-11 ENCOUNTER — APPOINTMENT (OUTPATIENT)
Dept: PSYCHIATRY | Facility: CLINIC | Age: 41
End: 2021-08-11
Payer: COMMERCIAL

## 2021-08-11 PROCEDURE — 90837 PSYTX W PT 60 MINUTES: CPT | Mod: 95

## 2021-08-12 ENCOUNTER — TRANSCRIPTION ENCOUNTER (OUTPATIENT)
Age: 41
End: 2021-08-12

## 2021-08-18 ENCOUNTER — APPOINTMENT (OUTPATIENT)
Dept: PSYCHIATRY | Facility: CLINIC | Age: 41
End: 2021-08-18
Payer: COMMERCIAL

## 2021-08-18 PROCEDURE — 90837 PSYTX W PT 60 MINUTES: CPT | Mod: 95

## 2021-08-25 ENCOUNTER — APPOINTMENT (OUTPATIENT)
Dept: PSYCHIATRY | Facility: CLINIC | Age: 41
End: 2021-08-25
Payer: COMMERCIAL

## 2021-08-25 PROCEDURE — 90837 PSYTX W PT 60 MINUTES: CPT | Mod: 95

## 2021-09-01 ENCOUNTER — APPOINTMENT (OUTPATIENT)
Dept: PSYCHIATRY | Facility: CLINIC | Age: 41
End: 2021-09-01
Payer: COMMERCIAL

## 2021-09-01 PROCEDURE — 90837 PSYTX W PT 60 MINUTES: CPT | Mod: 95

## 2021-09-13 ENCOUNTER — TRANSCRIPTION ENCOUNTER (OUTPATIENT)
Age: 41
End: 2021-09-13

## 2021-09-16 ENCOUNTER — TRANSCRIPTION ENCOUNTER (OUTPATIENT)
Age: 41
End: 2021-09-16

## 2021-09-23 ENCOUNTER — TRANSCRIPTION ENCOUNTER (OUTPATIENT)
Age: 41
End: 2021-09-23

## 2021-09-28 ENCOUNTER — OUTPATIENT (OUTPATIENT)
Dept: OUTPATIENT SERVICES | Facility: HOSPITAL | Age: 41
LOS: 1 days | End: 2021-09-28
Payer: COMMERCIAL

## 2021-09-28 VITALS
SYSTOLIC BLOOD PRESSURE: 124 MMHG | HEART RATE: 76 BPM | TEMPERATURE: 98 F | WEIGHT: 164.02 LBS | RESPIRATION RATE: 14 BRPM | HEIGHT: 64 IN | DIASTOLIC BLOOD PRESSURE: 84 MMHG | OXYGEN SATURATION: 97 %

## 2021-09-28 DIAGNOSIS — Z98.890 OTHER SPECIFIED POSTPROCEDURAL STATES: Chronic | ICD-10-CM

## 2021-09-28 DIAGNOSIS — Z90.49 ACQUIRED ABSENCE OF OTHER SPECIFIED PARTS OF DIGESTIVE TRACT: Chronic | ICD-10-CM

## 2021-09-28 DIAGNOSIS — N92.0 EXCESSIVE AND FREQUENT MENSTRUATION WITH REGULAR CYCLE: ICD-10-CM

## 2021-09-28 DIAGNOSIS — N92.1 EXCESSIVE AND FREQUENT MENSTRUATION WITH IRREGULAR CYCLE: ICD-10-CM

## 2021-09-28 DIAGNOSIS — Z01.818 ENCOUNTER FOR OTHER PREPROCEDURAL EXAMINATION: ICD-10-CM

## 2021-09-28 LAB
ANION GAP SERPL CALC-SCNC: 17 MMOL/L — SIGNIFICANT CHANGE UP (ref 5–17)
BLD GP AB SCN SERPL QL: NEGATIVE — SIGNIFICANT CHANGE UP
BUN SERPL-MCNC: 15 MG/DL — SIGNIFICANT CHANGE UP (ref 7–23)
CALCIUM SERPL-MCNC: 9.6 MG/DL — SIGNIFICANT CHANGE UP (ref 8.4–10.5)
CHLORIDE SERPL-SCNC: 102 MMOL/L — SIGNIFICANT CHANGE UP (ref 96–108)
CO2 SERPL-SCNC: 21 MMOL/L — LOW (ref 22–31)
CREAT SERPL-MCNC: 0.71 MG/DL — SIGNIFICANT CHANGE UP (ref 0.5–1.3)
GLUCOSE SERPL-MCNC: 92 MG/DL — SIGNIFICANT CHANGE UP (ref 70–99)
HCT VFR BLD CALC: 39 % — SIGNIFICANT CHANGE UP (ref 34.5–45)
HGB BLD-MCNC: 12.4 G/DL — SIGNIFICANT CHANGE UP (ref 11.5–15.5)
MCHC RBC-ENTMCNC: 28.8 PG — SIGNIFICANT CHANGE UP (ref 27–34)
MCHC RBC-ENTMCNC: 31.8 GM/DL — LOW (ref 32–36)
MCV RBC AUTO: 90.5 FL — SIGNIFICANT CHANGE UP (ref 80–100)
NRBC # BLD: 0 /100 WBCS — SIGNIFICANT CHANGE UP (ref 0–0)
PLATELET # BLD AUTO: 280 K/UL — SIGNIFICANT CHANGE UP (ref 150–400)
POTASSIUM SERPL-MCNC: 3.8 MMOL/L — SIGNIFICANT CHANGE UP (ref 3.5–5.3)
POTASSIUM SERPL-SCNC: 3.8 MMOL/L — SIGNIFICANT CHANGE UP (ref 3.5–5.3)
RBC # BLD: 4.31 M/UL — SIGNIFICANT CHANGE UP (ref 3.8–5.2)
RBC # FLD: 14.9 % — HIGH (ref 10.3–14.5)
RH IG SCN BLD-IMP: POSITIVE — SIGNIFICANT CHANGE UP
SODIUM SERPL-SCNC: 140 MMOL/L — SIGNIFICANT CHANGE UP (ref 135–145)
WBC # BLD: 9.38 K/UL — SIGNIFICANT CHANGE UP (ref 3.8–10.5)
WBC # FLD AUTO: 9.38 K/UL — SIGNIFICANT CHANGE UP (ref 3.8–10.5)

## 2021-09-28 PROCEDURE — 86901 BLOOD TYPING SEROLOGIC RH(D): CPT

## 2021-09-28 PROCEDURE — 86900 BLOOD TYPING SEROLOGIC ABO: CPT

## 2021-09-28 PROCEDURE — 36415 COLL VENOUS BLD VENIPUNCTURE: CPT

## 2021-09-28 PROCEDURE — 85027 COMPLETE CBC AUTOMATED: CPT

## 2021-09-28 PROCEDURE — G0463: CPT

## 2021-09-28 PROCEDURE — 86850 RBC ANTIBODY SCREEN: CPT

## 2021-09-28 PROCEDURE — 80048 BASIC METABOLIC PNL TOTAL CA: CPT

## 2021-09-28 RX ORDER — CHLORHEXIDINE GLUCONATE 213 G/1000ML
1 SOLUTION TOPICAL ONCE
Refills: 0 | Status: DISCONTINUED | OUTPATIENT
Start: 2021-10-13 | End: 2021-10-27

## 2021-09-28 RX ORDER — SODIUM CHLORIDE 9 MG/ML
3 INJECTION INTRAMUSCULAR; INTRAVENOUS; SUBCUTANEOUS EVERY 8 HOURS
Refills: 0 | Status: DISCONTINUED | OUTPATIENT
Start: 2021-10-13 | End: 2021-10-27

## 2021-09-28 RX ORDER — LIDOCAINE HCL 20 MG/ML
0.2 VIAL (ML) INJECTION ONCE
Refills: 0 | Status: DISCONTINUED | OUTPATIENT
Start: 2021-10-13 | End: 2021-10-27

## 2021-09-28 NOTE — H&P PST ADULT - NSICDXPASTSURGICALHX_GEN_ALL_CORE_FT
PAST SURGICAL HISTORY:  History of vocal cord polypectomy 2005    S/P appendectomy     S/P excision of lipoma of neck - 7/2016

## 2021-09-28 NOTE — H&P PST ADULT - CONSTITUTIONAL
Principal Discharge DX:	Alcohol intoxication  Secondary Diagnosis:	Fall from bed, initial encounter detailed exam

## 2021-09-28 NOTE — H&P PST ADULT - NSICDXPASTMEDICALHX_GEN_ALL_CORE_FT
PAST MEDICAL HISTORY:  Hypertension     Migraine     Vaginal delivery      PAST MEDICAL HISTORY:  COVID-19 3/10/2020 - not hospitalized    GERD (gastroesophageal reflux disease)     Hypertension     Menorrhagia     Migraine takes Nurtec prn    Preeclampsia     Vaginal delivery

## 2021-09-28 NOTE — H&P PST ADULT - HISTORY OF PRESENT ILLNESS
40 yo female with h/o HTN, GERD and c/o menorrhagia since insertion of Mirena IUD (now removed) is scheduled for D&C and Diagnostic Hysteroscopy with Novasure Ablation on 10/13/2021.    Pt is scheduled for Covid-19 PCR on 10/11/21 at [facility].

## 2021-09-29 PROBLEM — O14.90 UNSPECIFIED PRE-ECLAMPSIA, UNSPECIFIED TRIMESTER: Chronic | Status: ACTIVE | Noted: 2021-09-28

## 2021-09-29 PROBLEM — N92.0 EXCESSIVE AND FREQUENT MENSTRUATION WITH REGULAR CYCLE: Chronic | Status: ACTIVE | Noted: 2021-09-28

## 2021-09-29 PROBLEM — K21.9 GASTRO-ESOPHAGEAL REFLUX DISEASE WITHOUT ESOPHAGITIS: Chronic | Status: ACTIVE | Noted: 2021-09-28

## 2021-10-06 ENCOUNTER — NON-APPOINTMENT (OUTPATIENT)
Age: 41
End: 2021-10-06

## 2021-10-06 ENCOUNTER — APPOINTMENT (OUTPATIENT)
Dept: INTERNAL MEDICINE | Facility: CLINIC | Age: 41
End: 2021-10-06
Payer: COMMERCIAL

## 2021-10-06 VITALS
HEART RATE: 72 BPM | BODY MASS INDEX: 27.64 KG/M2 | DIASTOLIC BLOOD PRESSURE: 73 MMHG | OXYGEN SATURATION: 100 % | SYSTOLIC BLOOD PRESSURE: 119 MMHG | WEIGHT: 161 LBS | TEMPERATURE: 97 F

## 2021-10-06 PROBLEM — U07.1 COVID-19: Chronic | Status: ACTIVE | Noted: 2021-09-28

## 2021-10-06 PROBLEM — G43.909 MIGRAINE, UNSPECIFIED, NOT INTRACTABLE, WITHOUT STATUS MIGRAINOSUS: Chronic | Status: ACTIVE | Noted: 2018-09-28

## 2021-10-06 PROCEDURE — 93000 ELECTROCARDIOGRAM COMPLETE: CPT

## 2021-10-06 PROCEDURE — 99214 OFFICE O/P EST MOD 30 MIN: CPT | Mod: 25

## 2021-10-06 RX ORDER — BUDESONIDE AND FORMOTEROL FUMARATE DIHYDRATE 160; 4.5 UG/1; UG/1
160-4.5 AEROSOL RESPIRATORY (INHALATION) TWICE DAILY
Qty: 3 | Refills: 1 | Status: DISCONTINUED | COMMUNITY
Start: 2020-10-14 | End: 2021-10-06

## 2021-10-06 NOTE — ASSESSMENT
[Patient Optimized for Surgery] : Patient optimized for surgery [No Further Testing Recommended] : no further testing recommended [Continue medications as is] : Continue current medications [FreeTextEntry2] : N/A

## 2021-10-06 NOTE — HISTORY OF PRESENT ILLNESS
[No Pertinent Cardiac History] : no history of aortic stenosis, atrial fibrillation, coronary artery disease, recent myocardial infarction, or implantable device/pacemaker [Asthma] : asthma [Smoker] : smoker [No Adverse Anesthesia Reaction] : no adverse anesthesia reaction in self or family member [(Patient denies any chest pain, claudication, dyspnea on exertion, orthopnea, palpitations or syncope)] : Patient denies any chest pain, claudication, dyspnea on exertion, orthopnea, palpitations or syncope [Excellent (>10 METs)] : Excellent (>10 METs) [COPD] : no COPD [Sleep Apnea] : no sleep apnea [Chronic Anticoagulation] : no chronic anticoagulation [Chronic Kidney Disease] : no chronic kidney disease [Diabetes] : no diabetes [FreeTextEntry1] : Endometrial Ablation [FreeTextEntry2] : 10/13/2021 [FreeTextEntry3] : Dr. Agapito Hicks [FreeTextEntry4] : 41 year F with PMH HTN, GERD, Migraines, and COVID-19 presents for medical clearance. Pt denies CP/SOB, fever/chills, n/v/d/c.  [FreeTextEntry7] : Echo 12/2020, Stress Test 12/2020, PFTs 10/2020.

## 2021-10-07 ENCOUNTER — TRANSCRIPTION ENCOUNTER (OUTPATIENT)
Age: 41
End: 2021-10-07

## 2021-10-11 ENCOUNTER — OUTPATIENT (OUTPATIENT)
Dept: OUTPATIENT SERVICES | Facility: HOSPITAL | Age: 41
LOS: 1 days | End: 2021-10-11
Payer: COMMERCIAL

## 2021-10-11 DIAGNOSIS — Z98.890 OTHER SPECIFIED POSTPROCEDURAL STATES: Chronic | ICD-10-CM

## 2021-10-11 DIAGNOSIS — Z90.49 ACQUIRED ABSENCE OF OTHER SPECIFIED PARTS OF DIGESTIVE TRACT: Chronic | ICD-10-CM

## 2021-10-11 DIAGNOSIS — Z11.52 ENCOUNTER FOR SCREENING FOR COVID-19: ICD-10-CM

## 2021-10-11 LAB — SARS-COV-2 RNA SPEC QL NAA+PROBE: SIGNIFICANT CHANGE UP

## 2021-10-11 PROCEDURE — U0005: CPT

## 2021-10-11 PROCEDURE — U0003: CPT

## 2021-10-11 PROCEDURE — C9803: CPT

## 2021-10-12 ENCOUNTER — TRANSCRIPTION ENCOUNTER (OUTPATIENT)
Age: 41
End: 2021-10-12

## 2021-10-12 RX ORDER — OXYCODONE HYDROCHLORIDE 5 MG/1
5 TABLET ORAL ONCE
Refills: 0 | Status: DISCONTINUED | OUTPATIENT
Start: 2021-10-13 | End: 2021-10-13

## 2021-10-12 RX ORDER — IBUPROFEN 200 MG
600 TABLET ORAL ONCE
Refills: 0 | Status: DISCONTINUED | OUTPATIENT
Start: 2021-10-13 | End: 2021-10-27

## 2021-10-12 RX ORDER — SODIUM CHLORIDE 9 MG/ML
1000 INJECTION, SOLUTION INTRAVENOUS
Refills: 0 | Status: DISCONTINUED | OUTPATIENT
Start: 2021-10-13 | End: 2021-10-27

## 2021-10-12 RX ORDER — ONDANSETRON 8 MG/1
4 TABLET, FILM COATED ORAL ONCE
Refills: 0 | Status: DISCONTINUED | OUTPATIENT
Start: 2021-10-13 | End: 2021-10-27

## 2021-10-13 ENCOUNTER — OUTPATIENT (OUTPATIENT)
Dept: OUTPATIENT SERVICES | Facility: HOSPITAL | Age: 41
LOS: 1 days | End: 2021-10-13
Payer: COMMERCIAL

## 2021-10-13 ENCOUNTER — RESULT REVIEW (OUTPATIENT)
Age: 41
End: 2021-10-13

## 2021-10-13 VITALS
RESPIRATION RATE: 14 BRPM | HEART RATE: 87 BPM | HEIGHT: 64 IN | WEIGHT: 164.02 LBS | SYSTOLIC BLOOD PRESSURE: 113 MMHG | OXYGEN SATURATION: 99 % | DIASTOLIC BLOOD PRESSURE: 83 MMHG | TEMPERATURE: 98 F

## 2021-10-13 VITALS
DIASTOLIC BLOOD PRESSURE: 70 MMHG | SYSTOLIC BLOOD PRESSURE: 113 MMHG | TEMPERATURE: 98 F | RESPIRATION RATE: 13 BRPM | OXYGEN SATURATION: 98 % | HEART RATE: 78 BPM

## 2021-10-13 DIAGNOSIS — Z90.49 ACQUIRED ABSENCE OF OTHER SPECIFIED PARTS OF DIGESTIVE TRACT: Chronic | ICD-10-CM

## 2021-10-13 DIAGNOSIS — Z98.890 OTHER SPECIFIED POSTPROCEDURAL STATES: Chronic | ICD-10-CM

## 2021-10-13 DIAGNOSIS — N92.1 EXCESSIVE AND FREQUENT MENSTRUATION WITH IRREGULAR CYCLE: ICD-10-CM

## 2021-10-13 PROCEDURE — 88305 TISSUE EXAM BY PATHOLOGIST: CPT

## 2021-10-13 PROCEDURE — 58563 HYSTEROSCOPY ABLATION: CPT

## 2021-10-13 PROCEDURE — 88305 TISSUE EXAM BY PATHOLOGIST: CPT | Mod: 26

## 2021-10-13 RX ORDER — RIMEGEPANT SULFATE 75 MG/75MG
1 TABLET, ORALLY DISINTEGRATING ORAL
Qty: 0 | Refills: 0 | DISCHARGE

## 2021-10-13 RX ORDER — LISINOPRIL 2.5 MG/1
1 TABLET ORAL
Qty: 0 | Refills: 0 | DISCHARGE

## 2021-10-13 RX ORDER — FAMOTIDINE 10 MG/ML
1 INJECTION INTRAVENOUS
Qty: 0 | Refills: 0 | DISCHARGE

## 2021-10-13 NOTE — ASU PREOP CHECKLIST - ANTIBIOTIC
[FreeTextEntry1] : \par Medical Issue 1: Aortic Stenosis: coordinated follow-up and plan of care with cardiology team; planning to undergo TAVR\par \par Medical Issue 2: Dementia: prescribed Namenda and Donepezil medications; coordinated follow-up with neurologist for further evaluation; patient's family refusing nursing home placement at this time\par \par Medical Issue 3: High cholesterol: prescribed Simvastatin 40mg qhs and check lipid panel fasting for target LDL <70\par \par Medical Issue 4: BPH: not well controlled; pt has worsening frequency; prescribed Proscar and Flomax medications and coordinated follow-up care with Urologist\par \par Depression screen performed today, PHQ2=0
no (get order)

## 2021-10-13 NOTE — ASU PATIENT PROFILE, ADULT - NSICDXPASTMEDICALHX_GEN_ALL_CORE_FT
PAST MEDICAL HISTORY:  COVID-19 3/10/2020 - not hospitalized    GERD (gastroesophageal reflux disease)     Hypertension     Menorrhagia     Migraine takes Nurtec prn    Preeclampsia     Vaginal delivery

## 2021-10-13 NOTE — ASU DISCHARGE PLAN (ADULT/PEDIATRIC) - NURSING INSTRUCTIONS
Next dose of Tylenol will be on or after ______8:00 PM_____ ,today/tonight and every 6 hours afterwards for pain management, do not take any Tylenol containing products until this time. Your first dose of Tylenol was given at ______2:00 PM_____. Do not exceed more than 4000mg of Tylenol in one 24 hour setting. If no contraindications, you may alternate with Ibuprofen 3 hours after dose of Tylenol. Ibuprofen can be taken every 6 hours. First dose received at 2:17 PM

## 2021-10-13 NOTE — ASU DISCHARGE PLAN (ADULT/PEDIATRIC) - CARE PROVIDER_API CALL
Agapito Hicks)  Obstetrics and Gynecology  [address] [place] [address]  Phone: [phone]  Fax: [phone]  Follow Up Time: 1 month

## 2021-10-13 NOTE — BRIEF OPERATIVE NOTE - NSICDXBRIEFPROCEDURE_GEN_ALL_CORE_FT
PROCEDURES:  Hysteroscopy, with dilation and curettage of uterus and hydrothermal or NovaSure endometrial ablation 13-Oct-2021 14:34:17  Agapito Hicks

## 2021-10-20 LAB — SURGICAL PATHOLOGY STUDY: SIGNIFICANT CHANGE UP

## 2021-10-26 ENCOUNTER — NON-APPOINTMENT (OUTPATIENT)
Age: 41
End: 2021-10-26

## 2021-11-08 ENCOUNTER — APPOINTMENT (OUTPATIENT)
Dept: PULMONOLOGY | Facility: CLINIC | Age: 41
End: 2021-11-08
Payer: OTHER MISCELLANEOUS

## 2021-11-08 VITALS
TEMPERATURE: 97.3 F | WEIGHT: 161 LBS | OXYGEN SATURATION: 99 % | HEIGHT: 63 IN | SYSTOLIC BLOOD PRESSURE: 110 MMHG | BODY MASS INDEX: 28.53 KG/M2 | HEART RATE: 74 BPM | DIASTOLIC BLOOD PRESSURE: 76 MMHG | RESPIRATION RATE: 16 BRPM

## 2021-11-08 PROCEDURE — 99072 ADDL SUPL MATRL&STAF TM PHE: CPT

## 2021-11-08 PROCEDURE — 99214 OFFICE O/P EST MOD 30 MIN: CPT

## 2021-11-08 NOTE — PHYSICAL EXAM
[No Acute Distress] : no acute distress [Normal Oropharynx] : normal oropharynx [III] : Mallampati Class: III [Normal Appearance] : normal appearance [Normal Rate/Rhythm] : normal rate/rhythm [Normal S1, S2] : normal s1, s2 [No Murmurs] : no murmurs [No Resp Distress] : no resp distress [Clear to Auscultation Bilaterally] : clear to auscultation bilaterally [No Abnormalities] : no abnormalities [Benign] : benign [Normal Gait] : normal gait [No Clubbing] : no clubbing [No Cyanosis] : no cyanosis [No Edema] : no edema [FROM] : FROM [Normal Color/ Pigmentation] : normal color/ pigmentation [No Focal Deficits] : no focal deficits [Oriented x3] : oriented x3 [Normal Affect] : normal affect [TextBox_68] : I:E ratio 1:3; clear

## 2021-11-08 NOTE — ASSESSMENT
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 Her number one issue is intermittent SOB. (improved; s/p COVID 19 vaccine x 3). - still \par \par Her shortness of breath is multifactorial due to:\par -poor mechanics of breathing \par -out of shape / overweight\par -pulmonary disease:\par - s/p Covid 19 Infection RADS/ bronchospasm\par -?cardiac disease (no evidence)\par \par problem 1: RADS/ bronchospasm (s/p COVID-19 infection) (controlled) - exacerbated by cold air \par -continue Symbicort 160 2 puffs inhalations BID (restart)\par -continue Ventolin 2 puffs Q6H, pre-exercise \par Asthma is believed to be caused by inherited (genetic) and environmental factor, but its exact cause is unknown. Asthma may be triggered by allergens, lung infections, or irritants in the air. Asthma triggers are different for each person \par -Inhaler technique reviewed as well as oral hygiene techniques reviewed with patient. Avoidance of cold air, extremes of temperature, rescue inhaler should be used before exercise. Order of medication reviewed with patient. Recommended use of a cool mist humidifier in the bedroom. \par \par Problem 2: Allergies/ Sinuses\par -complete iron studies \par -continue Qnasl 1 sniff BID\par -s/p Blood work to include: asthma panel, food IgE panel(+), IgE level(-), eosinophil level(-), vitamin D level \par Environmental measures for allergies were encouraged including mattress and pillow covers, air purifier, and environmental controls. \par \par Problem 2A: Low Vitamin D\par -Has been associated with asthma exacerbations and increased allergic symptoms. The goal based on recent information is maintaining levels between 50-70 and low normal is 30. Recommended 50,000 units every two weeks to once a month depending on the level. \par \par Problem 3: GERD\par -continue Pepcid 40 mg QHS \par -Rule of 2s: avoid eating too much, eating too late, eating too spicy, eating two hours before bed.\par -Things to avoid including overeating, spicy foods, tight clothing, eating within three hours of bed, this list is not all inclusive. \par -For treatment of reflux, possible options discussed including diet control, H2 blockers, PPIs, as well as coating motility agents discussed as treatment options. Timing of meals and proximity of last meal to sleep were discussed. If symptoms persist, a formal gastrointestinal evaluation is needed.\par \par Problem 4: Former Smoker\par -no need for Lung CA screening (does not make criteria)\par Discussed for five minutes with the patient the risks/associations with continued smoking including COPD, emphysema, shortness of breath, renal cancer, bladder cancer, stroke risk, cardiac disease, etc. Smoking cessation was discussed at length and highly encouraged. Various options to aid cessation was discussed including use of Chantix, Nicotrol, nicotine products, laser therapy, hypnosis, Wellbutrin, etc.\par \par problem 5: poor breathing mechanics\par -Proper breathing techniques were reviewed with an emphasis of exhalation. Patient instructed to breath in for 1 second and out for four seconds. Patient was encouraged to not talk while walking. \par \par problem 6 : out of shape / overweight (improved) \par -Recommend "Muniq" OTC Supplement for weight loss, energy levels, and blood sugar levels \par -Weight loss, exercise, and diet control were discussed and are highly encouraged. Treatment options were given such as, aqua therapy, and contacting a nutritionist. Recommended to use the elliptical, stationary bike, less use of treadmill. Mindful eating was explained to the patient Obesity is associated with worsening asthma, shortness of breath, and potential for cardiac disease, diabetes, and other underlying medical conditions\par \par problem 7: S/p COVID 19\par - S/p Covid 19 vaccine (Pfizer) x3\par -COVID 19 precautions, vaccines, and boosters discussed at length and recommended with patient \par -s/p Blood work for CRP, D-dimer, Ferritin - WNL\par -s/p baby aspirin for 1 month\par -s/p COVID 19 vaccination (Pfizer: 2 of 2)\par \par Immune Support Recommendations:\par -OTC Vitamin C 500mg BID \par -OTC Quercetin 250-500mg BID \par -OTC Zinc 75-100mg per day \par -OTC Melatonin 1or 2mg a night \par -OTC Vitamin D 1-4000mg per day \par -OTC Tonic Water 8oz per day\par \par Problem 8: Supraclavicular Fullness / Lipoma\par -s/p an MRI of the neck. (WNL) \par \par problem 9: health maintenance\par -Recommended to Stay Hydrated  \par -recommended yearly flu shot - 2021\par -recommended strep pneumonia vaccines: Prevnar-13 vaccine, followed by Pneumo vaccine 23 one year following after 65\par -recommended early intervention for Upper Respiratory Infections (URIs)\par -recommended regular osteoporosis evaluations\par -recommended early dermatological evaluations\par -recommended after the age of 50 to the age of 70, colonoscopy every 5 years\par \par F/U in 3-4 months\par She is encouraged to call with any changes, concerns, or questions\par

## 2021-11-08 NOTE — HISTORY OF PRESENT ILLNESS
[TextBox_4] : Ms. TEJEDA is a 41 year old female presenting to the office today for f/u pulmonary evaluation. Her chief complaint is \par \par -she notes energy level is good\par -she notes gaining 7 lbs since June \par -she notes getting ablation during the summer that is now resolved\par -she notes heartburn and reflux controlled by Pepcid\par -she notes walking and running a 5k at the gym\par -she notes exercise limited by SOB \par -she denies PND ans sinus issues\par -she notes allergies are quiet\par -she notes sleep is stable \par -she notes getting enough sleep of about 8 hrs \par -she denies snoring \par -she denies feeling rested but nothing of concern\par -she notes children are now getting vaccinated \par -she notes not on Symbicort \par -she notes cold air has been causing increased SOB \par \par -denies any headaches, nausea, vomiting, fever, chills, sweats, chest pain, chest pressure, diarrhea, constipation, dysphagia, sour taste in the mouth, dizziness, leg swelling, leg pain, myalgias, arthralgias, itchy eyes, itchy ears, heartburn, or reflux.\par \par

## 2021-11-08 NOTE — ADDENDUM
[FreeTextEntry1] : Documented by Ludy Ledbetter acting as a scribe for Dr. Rafy Bond on (11/08/2021).\par \par All medical record entries made by the Scribe were at my, Dr. Rafy Bond's, direction and personally dictated by me on (11/08/2021). I have reviewed the chart and agree that the record accurately reflects my personal performance of the history, physical exam, assessment and plan. I have also personally directed, reviewed, and agree with the discharge instructions.\par

## 2021-11-09 LAB
BASOPHILS # BLD AUTO: 0.07 K/UL
BASOPHILS NFR BLD AUTO: 0.8 %
EOSINOPHIL # BLD AUTO: 0.21 K/UL
EOSINOPHIL NFR BLD AUTO: 2.5 %
FERRITIN SERPL-MCNC: 14 NG/ML
HCT VFR BLD CALC: 40.8 %
HGB BLD-MCNC: 12.9 G/DL
IMM GRANULOCYTES NFR BLD AUTO: 0.1 %
IRON SATN MFR SERPL: 11 %
IRON SERPL-MCNC: 41 UG/DL
LYMPHOCYTES # BLD AUTO: 2.22 K/UL
LYMPHOCYTES NFR BLD AUTO: 26.6 %
MAN DIFF?: NORMAL
MCHC RBC-ENTMCNC: 28.9 PG
MCHC RBC-ENTMCNC: 31.6 GM/DL
MCV RBC AUTO: 91.3 FL
MONOCYTES # BLD AUTO: 0.64 K/UL
MONOCYTES NFR BLD AUTO: 7.7 %
NEUTROPHILS # BLD AUTO: 5.2 K/UL
NEUTROPHILS NFR BLD AUTO: 62.3 %
PLATELET # BLD AUTO: 303 K/UL
RBC # BLD: 4.47 M/UL
RBC # FLD: 13.6 %
TIBC SERPL-MCNC: 370 UG/DL
UIBC SERPL-MCNC: 328 UG/DL
WBC # FLD AUTO: 8.35 K/UL

## 2021-11-16 ENCOUNTER — NON-APPOINTMENT (OUTPATIENT)
Age: 41
End: 2021-11-16

## 2021-12-02 ENCOUNTER — TRANSCRIPTION ENCOUNTER (OUTPATIENT)
Age: 41
End: 2021-12-02

## 2021-12-15 ENCOUNTER — TRANSCRIPTION ENCOUNTER (OUTPATIENT)
Age: 41
End: 2021-12-15

## 2022-01-04 ENCOUNTER — RX RENEWAL (OUTPATIENT)
Age: 42
End: 2022-01-04

## 2022-01-20 ENCOUNTER — TRANSCRIPTION ENCOUNTER (OUTPATIENT)
Age: 42
End: 2022-01-20

## 2022-01-22 ENCOUNTER — TRANSCRIPTION ENCOUNTER (OUTPATIENT)
Age: 42
End: 2022-01-22

## 2022-01-31 ENCOUNTER — APPOINTMENT (OUTPATIENT)
Dept: CARDIOLOGY | Facility: CLINIC | Age: 42
End: 2022-01-31
Payer: COMMERCIAL

## 2022-01-31 ENCOUNTER — NON-APPOINTMENT (OUTPATIENT)
Age: 42
End: 2022-01-31

## 2022-01-31 VITALS
SYSTOLIC BLOOD PRESSURE: 126 MMHG | OXYGEN SATURATION: 100 % | WEIGHT: 158 LBS | HEART RATE: 61 BPM | DIASTOLIC BLOOD PRESSURE: 86 MMHG | BODY MASS INDEX: 36.57 KG/M2 | HEIGHT: 55 IN

## 2022-01-31 PROCEDURE — 93000 ELECTROCARDIOGRAM COMPLETE: CPT

## 2022-01-31 PROCEDURE — 99214 OFFICE O/P EST MOD 30 MIN: CPT

## 2022-01-31 NOTE — HISTORY OF PRESENT ILLNESS
[FreeTextEntry1] : Patient is a 41 yo woman with PMH of HTN, preeclampsia 2008, migraines with aura presents in for follow up. \par Currently on weight watchers lost 5 lbs so far. \par HTN : BP Stable \par Continue Lisinopril 20 mg \par Encouraged Patient to monitor BP at home and keep a log and report results back to us for evaluation. Based on results, we will adjust medications as necessary. \par Additionally, encouraged heart healthy diet and exercise as tolerated.\par EKG with no acute changes.\par \par HLD : routine bliood work ( ordered)

## 2022-01-31 NOTE — DISCUSSION/SUMMARY
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 \par \par Currently on weight watchers lost 5 lbs so far.\par HTN : BP Stable \par Continue Lisinopril 20 mg \par Encouraged Patient to monitor BP at home and keep a log and report results back to us for evaluation. Based on results, we will adjust medications as necessary. \par Additionally, encouraged heart healthy diet and exercise as tolerated.\par EKG with no acute changes.\par \par HA : On Nurtec prn \par and Motrin

## 2022-02-07 NOTE — ED ADULT NURSE NOTE - CAS EDN DISCHARGE INTERVENTIONS
IV intact Erivedge Counseling- I discussed with the patient the risks of Erivedge including but not limited to nausea, vomiting, diarrhea, constipation, weight loss, changes in the sense of taste, decreased appetite, muscle spasms, and hair loss.  The patient verbalized understanding of the proper use and possible adverse effects of Erivedge.  All of the patient's questions and concerns were addressed.

## 2022-03-09 ENCOUNTER — NON-APPOINTMENT (OUTPATIENT)
Age: 42
End: 2022-03-09

## 2022-03-09 ENCOUNTER — APPOINTMENT (OUTPATIENT)
Dept: PULMONOLOGY | Facility: CLINIC | Age: 42
End: 2022-03-09
Payer: COMMERCIAL

## 2022-03-09 VITALS
RESPIRATION RATE: 16 BRPM | WEIGHT: 157 LBS | BODY MASS INDEX: 26.8 KG/M2 | HEIGHT: 64 IN | SYSTOLIC BLOOD PRESSURE: 120 MMHG | HEART RATE: 72 BPM | OXYGEN SATURATION: 99 % | DIASTOLIC BLOOD PRESSURE: 76 MMHG | TEMPERATURE: 97.2 F

## 2022-03-09 PROCEDURE — 99072 ADDL SUPL MATRL&STAF TM PHE: CPT

## 2022-03-09 PROCEDURE — 99214 OFFICE O/P EST MOD 30 MIN: CPT | Mod: 25

## 2022-03-09 PROCEDURE — 95012 NITRIC OXIDE EXP GAS DETER: CPT

## 2022-03-09 PROCEDURE — 94010 BREATHING CAPACITY TEST: CPT

## 2022-03-09 NOTE — HISTORY OF PRESENT ILLNESS
[TextBox_4] : Ms. TEJEDA is a 41 year old female presenting to the office today for f/u pulmonary evaluation. Her chief complaint is\par - she notes her energy levels are good \par - she has been trying to walk about 5k steps a day and she is on weight watchers\par - she notes exercise wise she has just been walking on the treadmill \par - she notes she takes pepcid daily but if she misses a time she will feel her acid reflux \par - bowels are regular \par - she had an endometrial ablation back in October \par - she notes her migraines have been controlled, she has not had one in awhile \par - she notes her sleep has been good \par - she notes she has lost 7lbs since january with weight watchers \par - she notes her breathing is so much better compared to last winter \par patient denies any headaches, nausea, vomiting, fever, chills, sweats, chest pain, chest pressure, palpitations, coughing, wheezing, fatigue, diarrhea, constipation, dysphagia, myalgias, dizziness, leg swelling, leg pain, itchy eyes, itchy ears, heartburn, reflux or sour taste in the mouth\par

## 2022-03-09 NOTE — ADDENDUM
[FreeTextEntry1] : Documented by Tomasa Rowley acting as a scribe for Dr. Rafy Bond on (03/09/2022).\par \par All medical record entries made by the Scribe were at my, Dr. Rafy Bond's, direction and personally dictated by me on (03/09/2022). I have reviewed the chart and agree that the record accurately reflects my personal performance of the history, physical exam, assessment and plan. I have also personally directed, reviewed, and agree with the discharge instructions.\par

## 2022-03-09 NOTE — PROCEDURE
[FreeTextEntry1] : PFT revealed normal flows, with a FEV1 of 2.97L, which is 100% of predicted, with a normal flow volume loop\par  \par Feno was 19 ; a normal value being less than 25. Fractional exhaled nitric oxide (FENO) is regarded as a simple, noninvasive method for assessing eosinophilic airway inflammation. Produced by a variety of cells within the lung, nitric oxide (NO) concentrations are generally low in healthy individuals. However, high concentrations of NO appear to be involved in nonspecific host defense mechanisms and chronic inflammatory  diseases such as asthma. The American Thor[employer] Society (ATS) therefore recommended using FENO to aid in the diagnosis and monitoring of eosinophilic airway inflammation and asthma, and for identifying steroid responsive individuals whose chronic respiratory symptoms may be caused by airway inflammation

## 2022-03-09 NOTE — ASSESSMENT
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 Her number one issue is intermittent SOB. (improved; s/p COVID 19 vaccine x 3). - reflux is #1 issue \par \par Her shortness of breath is multifactorial due to:\par -poor mechanics of breathing \par -out of shape / overweight\par -pulmonary disease:\par - s/p Covid 19 Infection RADS/ bronchospasm\par -?cardiac disease (no evidence)\par \par problem 1: RADS/ bronchospasm (s/p COVID-19 infection) (controlled) - exacerbated by cold air \par -continue Symbicort 160 2 puffs inhalations BID (restart)\par -continue Ventolin 2 puffs Q6H, pre-exercise \par Asthma is believed to be caused by inherited (genetic) and environmental factor, but its exact cause is unknown. Asthma may be triggered by allergens, lung infections, or irritants in the air. Asthma triggers are different for each person \par -Inhaler technique reviewed as well as oral hygiene techniques reviewed with patient. Avoidance of cold air, extremes of temperature, rescue inhaler should be used before exercise. Order of medication reviewed with patient. Recommended use of a cool mist humidifier in the bedroom. \par \par Problem 2: Allergies/ Sinuses\par -complete iron studies \par -continue Qnasl 1 sniff BID\par -s/p Blood work to include: asthma panel, food IgE panel(+), IgE level(-), eosinophil level(-), vitamin D level \par Environmental measures for allergies were encouraged including mattress and pillow covers, air purifier, and environmental controls. \par \par Problem 2A: Low Vitamin D\par -Has been associated with asthma exacerbations and increased allergic symptoms. The goal based on recent information is maintaining levels between 50-70 and low normal is 30. Recommended 50,000 units every two weeks to once a month depending on the level. \par \par Problem 3: GERD\par -continue Pepcid 40 mg QHS \par -Rule of 2s: avoid eating too much, eating too late, eating too spicy, eating two hours before bed.\par -Things to avoid including overeating, spicy foods, tight clothing, eating within three hours of bed, this list is not all inclusive. \par -For treatment of reflux, possible options discussed including diet control, H2 blockers, PPIs, as well as coating motility agents discussed as treatment options. Timing of meals and proximity of last meal to sleep were discussed. If symptoms persist, a formal gastrointestinal evaluation is needed.\par \par Problem 4: Former Smoker\par -no need for Lung CA screening (does not make criteria)\par Discussed for five minutes with the patient the risks/associations with continued smoking including COPD, emphysema, shortness of breath, renal cancer, bladder cancer, stroke risk, cardiac disease, etc. Smoking cessation was discussed at length and highly encouraged. Various options to aid cessation was discussed including use of Chantix, Nicotrol, nicotine products, laser therapy, hypnosis, Wellbutrin, etc.\par \par problem 5: poor breathing mechanics\par -Recommended Wim Hof and Buteyko breathing techniques \par -Proper breathing techniques were reviewed with an emphasis of exhalation. Patient instructed to breath in for 1 second and out for four seconds. Patient was encouraged to not talk while walking. \par \par problem 6 : out of shape / overweight (improved) \par -Recommend "Muniq" OTC Supplement for weight loss, energy levels, and blood sugar levels \par -Weight loss, exercise, and diet control were discussed and are highly encouraged. Treatment options were given such as, aqua therapy, and contacting a nutritionist. Recommended to use the elliptical, stationary bike, less use of treadmill. Mindful eating was explained to the patient Obesity is associated with worsening asthma, shortness of breath, and potential for cardiac disease, diabetes, and other underlying medical conditions\par \par problem 7: S/p COVID 19\par - S/p Covid 19 vaccine (Pfizer) x3\par -COVID 19 precautions, vaccines, and boosters discussed at length and recommended with patient \par -s/p Blood work for CRP, D-dimer, Ferritin - WNL\par -s/p baby aspirin for 1 month\par -s/p COVID 19 vaccination (Pfizer: 2 of 2)\par \par Immune Support Recommendations:\par -OTC Vitamin C 500mg BID \par -OTC Quercetin 250-500mg BID \par -OTC Zinc 75-100mg per day \par -OTC Melatonin 1or 2mg a night \par -OTC Vitamin D 1-4000mg per day \par -OTC Tonic Water 8oz per day\par \par Problem 8: Supraclavicular Fullness / Lipoma\par -s/p an MRI of the neck. (WNL) \par \par problem 9: health maintenance\par -Recommended to Stay Hydrated  \par -recommended yearly flu shot - 2021\par -recommended strep pneumonia vaccines: Prevnar-13 vaccine, followed by Pneumo vaccine 23 one year following after 65\par -recommended early intervention for Upper Respiratory Infections (URIs)\par -recommended regular osteoporosis evaluations\par -recommended early dermatological evaluations\par -recommended after the age of 50 to the age of 70, colonoscopy every 5 years\par \par F/U in 3-4 months\par She is encouraged to call with any changes, concerns, or questions\par

## 2022-03-14 ENCOUNTER — TRANSCRIPTION ENCOUNTER (OUTPATIENT)
Age: 42
End: 2022-03-14

## 2022-04-14 ENCOUNTER — APPOINTMENT (OUTPATIENT)
Dept: DERMATOLOGY | Facility: CLINIC | Age: 42
End: 2022-04-14
Payer: COMMERCIAL

## 2022-04-14 VITALS — BODY MASS INDEX: 27.29 KG/M2 | HEIGHT: 63 IN | WEIGHT: 154 LBS

## 2022-04-14 PROCEDURE — 99203 OFFICE O/P NEW LOW 30 MIN: CPT

## 2022-04-17 ENCOUNTER — TRANSCRIPTION ENCOUNTER (OUTPATIENT)
Age: 42
End: 2022-04-17

## 2022-04-21 ENCOUNTER — APPOINTMENT (OUTPATIENT)
Dept: INTERNAL MEDICINE | Facility: CLINIC | Age: 42
End: 2022-04-21
Payer: COMMERCIAL

## 2022-04-21 VITALS
WEIGHT: 154 LBS | SYSTOLIC BLOOD PRESSURE: 131 MMHG | HEIGHT: 63 IN | DIASTOLIC BLOOD PRESSURE: 86 MMHG | HEART RATE: 88 BPM | BODY MASS INDEX: 27.29 KG/M2 | OXYGEN SATURATION: 98 % | TEMPERATURE: 98 F

## 2022-04-21 DIAGNOSIS — R45.81 LOW SELF-ESTEEM: ICD-10-CM

## 2022-04-21 DIAGNOSIS — Z87.42 PERSONAL HISTORY OF OTHER DISEASES OF THE FEMALE GENITAL TRACT: ICD-10-CM

## 2022-04-21 DIAGNOSIS — Z01.818 ENCOUNTER FOR OTHER PREPROCEDURAL EXAMINATION: ICD-10-CM

## 2022-04-21 DIAGNOSIS — Z87.898 PERSONAL HISTORY OF OTHER SPECIFIED CONDITIONS: ICD-10-CM

## 2022-04-21 DIAGNOSIS — Z80.8 FAMILY HISTORY OF MALIGNANT NEOPLASM OF OTHER ORGANS OR SYSTEMS: ICD-10-CM

## 2022-04-21 PROCEDURE — 36415 COLL VENOUS BLD VENIPUNCTURE: CPT

## 2022-04-21 PROCEDURE — 99396 PREV VISIT EST AGE 40-64: CPT | Mod: 25

## 2022-04-21 RX ORDER — FAMOTIDINE 40 MG/1
40 TABLET, FILM COATED ORAL
Qty: 90 | Refills: 1 | Status: DISCONTINUED | COMMUNITY
Start: 2020-05-04 | End: 2022-04-21

## 2022-04-21 NOTE — HEALTH RISK ASSESSMENT
[Good] : ~his/her~  mood as  good [Former] : Former [20 or more] : 20 or more [Yes] : Yes [2 - 4 times a month (2 pts)] : 2-4 times a month (2 points) [1 or 2 (0 pts)] : 1 or 2 (0 points) [Never (0 pts)] : Never (0 points) [No] : In the past 12 months have you used drugs other than those required for medical reasons? No [0] : 2) Feeling down, depressed, or hopeless: Not at all (0) [PHQ-2 Negative - No further assessment needed] : PHQ-2 Negative - No further assessment needed [YearQuit] : 2019 [Audit-CScore] : 2 [AAR4Wrrfv] : 0 [Patient reported mammogram was normal] : Patient reported mammogram was normal [Patient reported PAP Smear was normal] : Patient reported PAP Smear was normal [With Family] : lives with family [Employed] : employed [] :  [# Of Children ___] : has [unfilled] children [MammogramDate] : 04/21 [PapSmearDate] : 04/21

## 2022-04-21 NOTE — HISTORY OF PRESENT ILLNESS
[de-identified] : 41 year F with PMH HTN presents for CPE. Pt denies CP/SOB, fever/chills, n/v/d/c.

## 2022-04-21 NOTE — PLAN
[FreeTextEntry1] : Routine blood work and urine today. Pt advised to sign up for [facility] portal to review labs and communicate any questions or concerns directly. Yearly physical and return as needed for illness, medication refills, and new or existing complaints.

## 2022-04-22 ENCOUNTER — TRANSCRIPTION ENCOUNTER (OUTPATIENT)
Age: 42
End: 2022-04-22

## 2022-04-22 LAB
25(OH)D3 SERPL-MCNC: 27.6 NG/ML
ALBUMIN SERPL ELPH-MCNC: 5.1 G/DL
ALP BLD-CCNC: 75 U/L
ALT SERPL-CCNC: 50 U/L
ANION GAP SERPL CALC-SCNC: 15 MMOL/L
APPEARANCE: CLEAR
AST SERPL-CCNC: 29 U/L
BACTERIA: ABNORMAL
BASOPHILS # BLD AUTO: 0.06 K/UL
BASOPHILS NFR BLD AUTO: 0.7 %
BILIRUB SERPL-MCNC: 0.4 MG/DL
BILIRUBIN URINE: NEGATIVE
BLOOD URINE: NEGATIVE
BUN SERPL-MCNC: 10 MG/DL
CALCIUM SERPL-MCNC: 9.9 MG/DL
CHLORIDE SERPL-SCNC: 106 MMOL/L
CHOLEST SERPL-MCNC: 204 MG/DL
CO2 SERPL-SCNC: 21 MMOL/L
COLOR: YELLOW
CREAT SERPL-MCNC: 0.63 MG/DL
EGFR: 114 ML/MIN/1.73M2
EOSINOPHIL # BLD AUTO: 0.24 K/UL
EOSINOPHIL NFR BLD AUTO: 2.6 %
ESTIMATED AVERAGE GLUCOSE: 123 MG/DL
GLUCOSE QUALITATIVE U: NEGATIVE
GLUCOSE SERPL-MCNC: 93 MG/DL
HBA1C MFR BLD HPLC: 5.9 %
HCT VFR BLD CALC: 46.2 %
HDLC SERPL-MCNC: 60 MG/DL
HGB BLD-MCNC: 15.1 G/DL
HYALINE CASTS: 4 /LPF
IMM GRANULOCYTES NFR BLD AUTO: 0.3 %
KETONES URINE: NEGATIVE
LDLC SERPL CALC-MCNC: 123 MG/DL
LEUKOCYTE ESTERASE URINE: NEGATIVE
LYMPHOCYTES # BLD AUTO: 2.23 K/UL
LYMPHOCYTES NFR BLD AUTO: 24.2 %
MAN DIFF?: NORMAL
MCHC RBC-ENTMCNC: 29.8 PG
MCHC RBC-ENTMCNC: 32.7 GM/DL
MCV RBC AUTO: 91.1 FL
MICROSCOPIC-UA: NORMAL
MONOCYTES # BLD AUTO: 0.69 K/UL
MONOCYTES NFR BLD AUTO: 7.5 %
NEUTROPHILS # BLD AUTO: 5.97 K/UL
NEUTROPHILS NFR BLD AUTO: 64.7 %
NITRITE URINE: POSITIVE
NONHDLC SERPL-MCNC: 144 MG/DL
PH URINE: 5.5
PLATELET # BLD AUTO: 275 K/UL
POTASSIUM SERPL-SCNC: 4.6 MMOL/L
PROT SERPL-MCNC: 7.5 G/DL
PROTEIN URINE: NORMAL
RBC # BLD: 5.07 M/UL
RBC # FLD: 14.1 %
RED BLOOD CELLS URINE: 8 /HPF
SODIUM SERPL-SCNC: 142 MMOL/L
SPECIFIC GRAVITY URINE: 1.02
SQUAMOUS EPITHELIAL CELLS: 8 /HPF
TRIGL SERPL-MCNC: 103 MG/DL
TSH SERPL-ACNC: 1.17 UIU/ML
UROBILINOGEN URINE: NORMAL
WBC # FLD AUTO: 9.22 K/UL
WHITE BLOOD CELLS URINE: 3 /HPF

## 2022-04-26 NOTE — H&P PST ADULT - URINARY CATHETER
Medication reconciliation completed. Please refer to specifics in home medication list (outpatient medication review). Medications verified with patient and [Pharmacy name, phone #, spoke to [facility], technician].    Home Medications:  Advair Diskus 500 mcg-50 mcg inhalation powder: 1 puff(s) inhaled 2 times a day  amiodarone 400 mg oral tablet: 1 tab(s) orally once a day   Aspirin Enteric Coated 81 mg oral delayed release tablet: 1 tab(s) orally once a day  HumaLOG KwikPen 100 units/mL injectable solution: 10 unit(s) injectable 3 times a day (before meals)   Pepcid 20 mg oral tablet: 1 tab(s) orally 2 times a day   predniSONE 10 mg oral tablet: 3 tab(s) orally once a day   ProAir 90 mcg/inh aerosol inhaler: 2 puffs orally 4 times daily  sodium bicarbonate 650 mg oral tablet: 2 tab(s) orally 2 times a day for 15 days (start 4/23/22)   tamsulosin 0.4 mg oral capsule: 2 cap(s) orally once a day   Toprol-XL 50 mg oral tablet, extended release: 1.5 tab(s) orally once a day   Toujeo SoloStar 300 units/mL subcutaneous solution: 18 unit(s) subcutaneous once a day (at bedtime)   Vitamin D3 25 mcg (1000 intl units) oral tablet: 1 tab(s) orally once a day     Note:  Patient is on Toujeo 18 units HS and Humalog 10 units TID before meals at home. Inpatient Lantus has been increased to Lantus 20 units and Admelog 5 units TID before meals was added. Will monitor blood glucose. Patient was prescribed Atovaquone 750 mg/5mL 10 mL po daily but has not picked up from pharmacy due to high cost. He also describes intolerance to statins, such as pain and numbness in fingers.    Time spent: 10 minutes     Kyra Haney PharmD  PGY-1 Pharmacy Resident  [facility] #[id number]
Medication reconciliation completed. Please refer to specifics in home medication list (outpatient medication review). Medications verified with patient and [facility].    Home Medications:  Advair Diskus 500 mcg-50 mcg inhalation powder: 1 puff(s) inhaled 2 times a day  amiodarone 400 mg oral tablet: 1 tab(s) orally once a day   Aspirin Enteric Coated 81 mg oral delayed release tablet: 1 tab(s) orally once a day  HumaLOG KwikPen 100 units/mL injectable solution: 10 unit(s) injectable 3 times a day (before meals)   Pepcid 20 mg oral tablet: 1 tab(s) orally 2 times a day   predniSONE 10 mg oral tablet: 3 tab(s) orally once a day   ProAir 90 mcg/inh aerosol inhaler: 2 puffs orally 4 times daily  sodium bicarbonate 650 mg oral tablet: 2 tab(s) orally 2 times a day for 15 days (start 4/23/22)   tamsulosin 0.4 mg oral capsule: 2 cap(s) orally once a day   Toprol-XL 50 mg oral tablet, extended release: 1.5 tab(s) orally once a day   Toujeo SoloStar 300 units/mL subcutaneous solution: 18 unit(s) subcutaneous once a day (at bedtime)   Vitamin D3 25 mcg (1000 intl units) oral tablet: 1 tab(s) orally once a day     Note:  Patient is on Toujeo 18 units HS and Humalog 10 units TID before meals at home. Inpatient Lantus has been increased to Lantus 20 units and Admelog 5 units TID before meals was added. Will monitor blood glucose. Patient was prescribed Atovaquone 750 mg/5mL 10 mL po daily but has not picked up from pharmacy due to high cost. He also describes intolerance to statins, such as pain and numbness in fingers.    Time spent: 10 minutes     Kyra Haney, PharmD  PGY-1 Pharmacy Resident  [facility] #[id number]
No
no

## 2022-05-04 ENCOUNTER — APPOINTMENT (OUTPATIENT)
Dept: ULTRASOUND IMAGING | Facility: CLINIC | Age: 42
End: 2022-05-04
Payer: COMMERCIAL

## 2022-05-04 ENCOUNTER — APPOINTMENT (OUTPATIENT)
Dept: MAMMOGRAPHY | Facility: CLINIC | Age: 42
End: 2022-05-04
Payer: COMMERCIAL

## 2022-05-04 PROCEDURE — G0279: CPT

## 2022-05-04 PROCEDURE — 76641 ULTRASOUND BREAST COMPLETE: CPT | Mod: 50

## 2022-05-04 PROCEDURE — 77066 DX MAMMO INCL CAD BI: CPT

## 2022-05-20 ENCOUNTER — TRANSCRIPTION ENCOUNTER (OUTPATIENT)
Age: 42
End: 2022-05-20

## 2022-05-20 LAB
RAPID RVP RESULT: DETECTED
SARS-COV-2 RNA PNL RESP NAA+PROBE: DETECTED

## 2022-05-26 ENCOUNTER — RX RENEWAL (OUTPATIENT)
Age: 42
End: 2022-05-26

## 2022-05-27 ENCOUNTER — APPOINTMENT (OUTPATIENT)
Dept: INTERNAL MEDICINE | Facility: CLINIC | Age: 42
End: 2022-05-27
Payer: COMMERCIAL

## 2022-05-27 VITALS
DIASTOLIC BLOOD PRESSURE: 85 MMHG | TEMPERATURE: 98.1 F | HEART RATE: 86 BPM | BODY MASS INDEX: 27.11 KG/M2 | SYSTOLIC BLOOD PRESSURE: 127 MMHG | HEIGHT: 63 IN | WEIGHT: 153 LBS | OXYGEN SATURATION: 96 %

## 2022-05-27 PROCEDURE — 99212 OFFICE O/P EST SF 10 MIN: CPT | Mod: 25

## 2022-05-27 NOTE — HISTORY OF PRESENT ILLNESS
[de-identified] : 42 year F with PMH HTN presents for f/u after COVID-19 and pending travel. Pt denies CP/SOB, fever/chills, n/v/d/c.

## 2022-05-27 NOTE — PLAN
[FreeTextEntry1] : Letter written. Pt advised to sign up for [facility] portal to review labs and communicate any questions or concerns directly. Yearly physical and return as needed for illness, medication refills, and new or existing complaints.

## 2022-06-07 ENCOUNTER — RX RENEWAL (OUTPATIENT)
Age: 42
End: 2022-06-07

## 2022-07-27 ENCOUNTER — APPOINTMENT (OUTPATIENT)
Dept: PULMONOLOGY | Facility: CLINIC | Age: 42
End: 2022-07-27

## 2022-07-27 ENCOUNTER — NON-APPOINTMENT (OUTPATIENT)
Age: 42
End: 2022-07-27

## 2022-07-27 VITALS
BODY MASS INDEX: 27.46 KG/M2 | TEMPERATURE: 97.5 F | SYSTOLIC BLOOD PRESSURE: 110 MMHG | DIASTOLIC BLOOD PRESSURE: 70 MMHG | HEIGHT: 63 IN | OXYGEN SATURATION: 98 % | HEART RATE: 82 BPM | RESPIRATION RATE: 17 BRPM | WEIGHT: 155 LBS

## 2022-07-27 LAB — CRP SERPL HS-MCNC: 1.75 MG/L

## 2022-07-27 PROCEDURE — 94010 BREATHING CAPACITY TEST: CPT

## 2022-07-27 PROCEDURE — 99072 ADDL SUPL MATRL&STAF TM PHE: CPT

## 2022-07-27 PROCEDURE — 99214 OFFICE O/P EST MOD 30 MIN: CPT | Mod: 25

## 2022-07-27 RX ORDER — PREDNISONE 20 MG/1
20 TABLET ORAL
Qty: 10 | Refills: 0 | Status: DISCONTINUED | COMMUNITY
Start: 2022-04-17

## 2022-07-27 RX ORDER — AMOXICILLIN 875 MG/1
875 TABLET, FILM COATED ORAL
Qty: 20 | Refills: 0 | Status: DISCONTINUED | COMMUNITY
Start: 2022-04-17

## 2022-07-27 NOTE — ASSESSMENT
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 improved; s/p COVID 19 vaccine x 3). - her #1 issue is post covid HR variability \par \par Her shortness of breath is multifactorial due to:\par -poor mechanics of breathing \par -out of shape / overweight\par -pulmonary disease:\par - s/p Covid 19 Infection RADS/ bronchospasm\par -?cardiac disease (no evidence)\par \par Problem 1a: Long Covid (HR variability) \par -add Allegra 180 mg QAM \par -Add Benadryl 25mg QPM\par -recommended "2 week detox" by Cristiana followed by 2 weeks of probiotics \par -Recommended "Low Histamine Diet" \par -Recommended proper hydration \par -Complete Cardiac CRP\par -If no improvement, pt is to get evaluated by cardiologist \par \par \par \par problem 1: RADS/ bronchospasm (s/p COVID-19 infection) (controlled) - exacerbated by cold air \par -continue Symbicort 160 2 puffs inhalations BID (restart)\par -continue Ventolin 2 puffs Q6H, pre-exercise \par Asthma is believed to be caused by inherited (genetic) and environmental factor, but its exact cause is unknown. Asthma may be triggered by allergens, lung infections, or irritants in the air. Asthma triggers are different for each person \par -Inhaler technique reviewed as well as oral hygiene techniques reviewed with patient. Avoidance of cold air, extremes of temperature, rescue inhaler should be used before exercise. Order of medication reviewed with patient. Recommended use of a cool mist humidifier in the bedroom. \par \par Problem 2: Allergies/ Sinuses\par -complete iron studies \par -continue Qnasl 1 sniff BID\par -s/p Blood work to include: asthma panel, food IgE panel(+), IgE level(-), eosinophil level(-), vitamin D level \par Environmental measures for allergies were encouraged including mattress and pillow covers, air purifier, and environmental controls. \par \par Problem 2A: Low Vitamin D\par -Has been associated with asthma exacerbations and increased allergic symptoms. The goal based on recent information is maintaining levels between 50-70 and low normal is 30. Recommended 50,000 units every two weeks to once a month depending on the level. \par \par Problem 3: GERD\par -continue Pepcid 40 mg QHS \par -Rule of 2s: avoid eating too much, eating too late, eating too spicy, eating two hours before bed.\par -Things to avoid including overeating, spicy foods, tight clothing, eating within three hours of bed, this list is not all inclusive. \par -For treatment of reflux, possible options discussed including diet control, H2 blockers, PPIs, as well as coating motility agents discussed as treatment options. Timing of meals and proximity of last meal to sleep were discussed. If symptoms persist, a formal gastrointestinal evaluation is needed.\par \par Problem 4: Former Smoker\par -no need for Lung CA screening (does not make criteria)\par Discussed for five minutes with the patient the risks/associations with continued smoking including COPD, emphysema, shortness of breath, renal cancer, bladder cancer, stroke risk, cardiac disease, etc. Smoking cessation was discussed at length and highly encouraged. Various options to aid cessation was discussed including use of Chantix, Nicotrol, nicotine products, laser therapy, hypnosis, Wellbutrin, etc.\par \par problem 5: poor breathing mechanics\par -Recommended Wim Hof and Buteyko breathing techniques \par -Proper breathing techniques were reviewed with an emphasis of exhalation. Patient instructed to breath in for 1 second and out for four seconds. Patient was encouraged to not talk while walking. \par \par problem 6 : out of shape / overweight (improved) \par - recommended "10-Day Detox" by Harinder Campbell \par -Recommend "Muniq" OTC Supplement for weight loss, energy levels, and blood sugar levels \par -Weight loss, exercise, and diet control were discussed and are highly encouraged. Treatment options were given such as, aqua therapy, and contacting a nutritionist. Recommended to use the elliptical, stationary bike, less use of treadmill. Mindful eating was explained to the patient Obesity is associated with worsening asthma, shortness of breath, and potential for cardiac disease, diabetes, and other underlying medical conditions\par \par problem 7: S/p COVID 19\par - S/p Covid 19 vaccine (Pfizer) x3; no additional booster recommended \par -COVID 19 precautions, vaccines, and boosters discussed at length and recommended with patient \par -s/p Blood work for CRP, D-dimer, Ferritin - WNL\par -s/p baby aspirin for 1 month\par -s/p COVID 19 vaccination (Pfizer: 2 of 2)\par \par Immune Support Recommendations:\par -OTC Vitamin C 500mg BID \par -OTC Quercetin 250-500mg BID \par -OTC Zinc 75-100mg per day \par -OTC Melatonin 1or 2mg a night \par -OTC Vitamin D 1-4000mg per day \par -OTC Tonic Water 8oz per day\par \par Problem 8: Supraclavicular Fullness / Lipoma\par -s/p an MRI of the neck. (WNL) \par \par problem 9: health maintenance\par -Recommended to Stay Hydrated  \par -recommended yearly flu shot - 2021\par -recommended strep pneumonia vaccines: Prevnar-13 vaccine, followed by Pneumo vaccine 23 one year following after 65\par -recommended early intervention for Upper Respiratory Infections (URIs)\par -recommended regular osteoporosis evaluations\par -recommended early dermatological evaluations\par -recommended after the age of 50 to the age of 70, colonoscopy every 5 years\par \par F/U in 3-4 months\par She is encouraged to call with any changes, concerns, or questions\par

## 2022-07-27 NOTE — HISTORY OF PRESENT ILLNESS
[TextBox_4] : Ms. TEJEDA is a 42 year old female presenting to the office today for f/u pulmonary evaluation. Her chief complaint is\par -she is s/p recent covid 19 infection 5/2022\par -she notes her bowels are regular \par -she is coughing up phlegm frequently \par -her reflux is improved \par -she notes post covid 19 HR variability \par \par -patient denies any headaches, nausea, vomiting, fever, chills, sweats, chest pain, chest pressure, palpitations, coughing, wheezing, fatigue, diarrhea, constipation, dysphagia, myalgias, dizziness, leg swelling, leg pain, itchy eyes, itchy ears, heartburn, reflux or sour taste in the mouth

## 2022-07-27 NOTE — REASON FOR VISIT
[Follow-Up] : a follow-up visit [TextBox_44] : s/p COVID-19 infection x2 5/2022, RADS / Allergy / GERD

## 2022-07-27 NOTE — ADDENDUM
[FreeTextEntry1] : Documented by Elia Franklin acting as a scribe for Dr. Rafy Bond on 07/27/2022.\par \par All medical record entries made by the Scribe were at my, Dr. Rafy Bond's, direction and personally dictated by me on 07/27/2022. I have reviewed the chart and agree that the record accurately reflects my personal performance of the history, physical exam, assessment and plan. I have also personally directed, reviewed, and agree with the discharge instructions.

## 2022-07-27 NOTE — PROCEDURE
[FreeTextEntry1] : PFT revealed normal flows, with a FEV1 of 2.93L, which is 99% of predicted, with a normal flow volume loop\par  \par -Images and procedures reviewed in detail and discussed with patient.

## 2022-07-28 ENCOUNTER — NON-APPOINTMENT (OUTPATIENT)
Age: 42
End: 2022-07-28

## 2022-07-28 ENCOUNTER — TRANSCRIPTION ENCOUNTER (OUTPATIENT)
Age: 42
End: 2022-07-28

## 2022-08-01 ENCOUNTER — TRANSCRIPTION ENCOUNTER (OUTPATIENT)
Age: 42
End: 2022-08-01

## 2022-08-16 ENCOUNTER — RX RENEWAL (OUTPATIENT)
Age: 42
End: 2022-08-16

## 2022-09-02 ENCOUNTER — TRANSCRIPTION ENCOUNTER (OUTPATIENT)
Age: 42
End: 2022-09-02

## 2022-09-11 ENCOUNTER — NON-APPOINTMENT (OUTPATIENT)
Age: 42
End: 2022-09-11

## 2022-09-12 ENCOUNTER — APPOINTMENT (OUTPATIENT)
Dept: ORTHOPEDIC SURGERY | Facility: CLINIC | Age: 42
End: 2022-09-12

## 2022-09-12 VITALS — HEIGHT: 63.5 IN | WEIGHT: 155 LBS | BODY MASS INDEX: 27.12 KG/M2

## 2022-09-12 PROCEDURE — L4350: CPT | Mod: RT

## 2022-09-12 PROCEDURE — 73630 X-RAY EXAM OF FOOT: CPT | Mod: RT

## 2022-09-12 PROCEDURE — 73600 X-RAY EXAM OF ANKLE: CPT | Mod: RT

## 2022-09-12 PROCEDURE — 99203 OFFICE O/P NEW LOW 30 MIN: CPT

## 2022-09-12 NOTE — PHYSICAL EXAM
[NL (40)] : plantar flexion 40 degrees [NL 30)] : inversion 30 degrees [NL (20)] : eversion 20 degrees [2+] : dorsalis pedis pulse: 2+ [Right] : right foot [Weight -] : weightbearing [There are no fractures, subluxations or dislocations. No significant abnormalities are seen] : There are no fractures, subluxations or dislocations. No significant abnormalities are seen [5___] : eversion 5[unfilled]/5 [] : patient ambulates without assistive device [de-identified] : mildly pos ant drawer [de-identified] : There is an os peroneum

## 2022-09-12 NOTE — HISTORY OF PRESENT ILLNESS
[Sudden] : sudden [5] : 5 [1] : 2 [Sharp] : sharp [Throbbing] : throbbing [Constant] : constant [Household chores] : household chores [Leisure] : leisure [Rest] : rest [Meds] : meds [Standing] : standing [Walking] : walking [de-identified] : 9/1/22: R foot/ankle pain after inversion injury in a restaurant after she missed a step on 9/9/22. She heard a crack with increasing pain. Difficulty bearing weight. She was seen at [facility] urgent care where xrays done. WBAT in post op shoe and ace wrap. +Advil/Ice. no prior sig ankle probs. no dm. former tob. RN at [employer] [] : no [FreeTextEntry1] : Right foot [FreeTextEntry3] : 9/9/2022 [FreeTextEntry5] : ARMIDA TEJEDA is a 42 year old F here for evaluation of right foot pain. Pt states that while at a restaurant, she missed a 3 inch step resulting in her twisting her right foot and falling. Pt claims she heard a crack and had excruciating pain.  [FreeTextEntry7] : Pain is radiating to right ankle [FreeTextEntry9] : [facility] [de-identified] : 9/12/22 [de-identified] : [facility] [de-identified] : xray

## 2022-09-23 ENCOUNTER — TRANSCRIPTION ENCOUNTER (OUTPATIENT)
Age: 42
End: 2022-09-23

## 2022-09-29 ENCOUNTER — TRANSCRIPTION ENCOUNTER (OUTPATIENT)
Age: 42
End: 2022-09-29

## 2022-10-12 ENCOUNTER — APPOINTMENT (OUTPATIENT)
Dept: ORTHOPEDIC SURGERY | Facility: CLINIC | Age: 42
End: 2022-10-12

## 2022-10-12 ENCOUNTER — RESULT REVIEW (OUTPATIENT)
Age: 42
End: 2022-10-12

## 2022-10-12 PROCEDURE — 99213 OFFICE O/P EST LOW 20 MIN: CPT

## 2022-10-12 NOTE — PHYSICAL EXAM
[NL (40)] : plantar flexion 40 degrees [NL 30)] : inversion 30 degrees [NL (20)] : eversion 20 degrees [5___] : eversion 5[unfilled]/5 [2+] : dorsalis pedis pulse: 2+ [Right] : right foot [Weight -] : weightbearing [There are no fractures, subluxations or dislocations. No significant abnormalities are seen] : There are no fractures, subluxations or dislocations. No significant abnormalities are seen [] : non-antalgic [de-identified] : There is an os peroneum

## 2022-10-12 NOTE — HISTORY OF PRESENT ILLNESS
[Sudden] : sudden [5] : 5 [1] : 2 [Sharp] : sharp [Constant] : constant [Household chores] : household chores [Leisure] : leisure [Rest] : rest [Meds] : meds [Standing] : standing [Walking] : walking [Dull/Aching] : dull/aching [de-identified] : 9/1/22: R foot/ankle pain after inversion injury in a restaurant after she missed a step on 9/9/22. She heard a crack with increasing pain. Difficulty bearing weight. She was seen at [facility] urgent care where xrays done. WBAT in post op shoe and ace wrap. +Advil/Ice. no prior sig ankle probs. 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 walking in reg shoes. \par  [] : no [FreeTextEntry1] : Right foot [FreeTextEntry3] : 9/9/2022 [FreeTextEntry5] : ARMIDA TEJEDA is a 42 year old F here for evaluation of right foot pain. Pt states that while at a restaurant, she missed a 3 inch step resulting in her twisting her right foot and falling. Pt claims she heard a crack and had excruciating pain.  [FreeTextEntry7] : Pain is radiating to right ankle [FreeTextEntry9] : [facility] [de-identified] : 9/12/22 [de-identified] : [facility] [de-identified] : xray

## 2022-10-12 NOTE — ASSESSMENT
[FreeTextEntry1] : wbat\par otc compression sleeve\par activity as nico\par declined PT\par f/up prn

## 2022-10-19 ENCOUNTER — APPOINTMENT (OUTPATIENT)
Dept: INTERNAL MEDICINE | Facility: CLINIC | Age: 42
End: 2022-10-19

## 2022-10-19 PROCEDURE — 99212 OFFICE O/P EST SF 10 MIN: CPT | Mod: 95

## 2022-10-19 NOTE — PHYSICAL EXAM
[No Acute Distress] : no acute distress [Well Nourished] : well nourished [Well Developed] : well developed [Well-Appearing] : well-appearing [No Respiratory Distress] : no respiratory distress  [No Accessory Muscle Use] : no accessory muscle use [Coordination Grossly Intact] : coordination grossly intact [No Focal Deficits] : no focal deficits [Normal Gait] : normal gait [Normal Affect] : the affect was normal [Normal Insight/Judgement] : insight and judgment were intact [de-identified] : Telehealth Visit. Limited to visual inspection only.

## 2022-10-19 NOTE — PLAN
[FreeTextEntry1] : Plan as above. Rx sent. Pt advised to sign up for [facility] portal to review labs and communicate any questions or concerns directly. Yearly physical and return as needed for illness, medication refills, and new or existing complaints.

## 2022-10-19 NOTE — HISTORY OF PRESENT ILLNESS
[Home] : at home, [unfilled] , at the time of the visit. [Medical Office: ([facility])___] : at the medical office located in  [Verbal consent obtained from patient] : the patient, [unfilled] [de-identified] : 42 year F with PMH HTN presents via TEB for smoking cessation. Pt denies CP/SOB, fever/chills, n/v/d/c.

## 2022-12-02 ENCOUNTER — NON-APPOINTMENT (OUTPATIENT)
Age: 42
End: 2022-12-02

## 2022-12-07 ENCOUNTER — APPOINTMENT (OUTPATIENT)
Dept: PULMONOLOGY | Facility: CLINIC | Age: 42
End: 2022-12-07

## 2022-12-07 VITALS
HEIGHT: 63 IN | WEIGHT: 156 LBS | BODY MASS INDEX: 27.64 KG/M2 | OXYGEN SATURATION: 99 % | SYSTOLIC BLOOD PRESSURE: 110 MMHG | HEART RATE: 78 BPM | TEMPERATURE: 97.4 F | RESPIRATION RATE: 16 BRPM | DIASTOLIC BLOOD PRESSURE: 60 MMHG

## 2022-12-07 PROCEDURE — 95012 NITRIC OXIDE EXP GAS DETER: CPT

## 2022-12-07 PROCEDURE — 94010 BREATHING CAPACITY TEST: CPT

## 2022-12-07 PROCEDURE — 99214 OFFICE O/P EST MOD 30 MIN: CPT | Mod: 25

## 2022-12-07 PROCEDURE — ZZZZZ: CPT

## 2022-12-07 PROCEDURE — 94729 DIFFUSING CAPACITY: CPT

## 2022-12-07 PROCEDURE — 94727 GAS DIL/WSHOT DETER LNG VOL: CPT

## 2022-12-07 NOTE — ASSESSMENT
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 improved; s/p COVID 19 vaccine x 3). - her #1 issue is post covid HR variability (Still) \par \par Her shortness of breath is multifactorial due to:\par -poor mechanics of breathing \par -out of shape / overweight\par -pulmonary disease:\par - s/p Covid 19 Infection RADS/ bronchospasm\par -?cardiac disease (no evidence)\par \par Problem 1a: Long Covid (HR variability) \par -add Allegra 180 mg QAM \par -Add Benadryl 25mg QPM\par -recommended "2 week detox" by Cristiana followed by 2 weeks of probiotics \par -Recommended "Low Histamine Diet" \par -Recommended proper hydration \par -s/p Cardiac CRP- recommended supplemental magnesium \par -If no improvement, pt is to get evaluated by cardiologist \par \par problem 1: RADS/ bronchospasm (s/p COVID-19 infection) (controlled) - exacerbated by cold air \par -continue Symbicort 160 2 puffs inhalations BID (restart)\par -continue Ventolin 2 puffs Q6H, pre-exercise \par Asthma is believed to be caused by inherited (genetic) and environmental factor, but its exact cause is unknown. Asthma may be triggered by allergens, lung infections, or irritants in the air. Asthma triggers are different for each person \par -Inhaler technique reviewed as well as oral hygiene techniques reviewed with patient. Avoidance of cold air, extremes of temperature, rescue inhaler should be used before exercise. Order of medication reviewed with patient. Recommended use of a cool mist humidifier in the bedroom. \par \par Problem 2: Allergies/ Sinuses\par -continue Qnasl 1 sniff BID or Dymista 1 sniff each nostril BID \par -s/p Blood work to include: asthma panel, food IgE panel(+), IgE level(-), eosinophil level(-), vitamin D level \par Environmental measures for allergies were encouraged including mattress and pillow covers, air purifier, and environmental controls. \par \par Problem 2A: Low Vitamin D\par -Has been associated with asthma exacerbations and increased allergic symptoms. The goal based on recent information is maintaining levels between 50-70 and low normal is 30. Recommended 50,000 units every two weeks to once a month depending on the level. \par \par Problem 3: GERD- active \par -continue Pepcid 40 mg QHS \par -Rule of 2s: avoid eating too much, eating too late, eating too spicy, eating two hours before bed.\par -Things to avoid including overeating, spicy foods, tight clothing, eating within three hours of bed, this list is not all inclusive. \par -For treatment of reflux, possible options discussed including diet control, H2 blockers, PPIs, as well as coating motility agents discussed as treatment options. Timing of meals and proximity of last meal to sleep were discussed. If symptoms persist, a formal gastrointestinal evaluation is needed.\par \par Problem 4: Former Smoker\par -no need for Lung CA screening (does not make criteria)\par Discussed for five minutes with the patient the risks/associations with continued smoking including COPD, emphysema, shortness of breath, renal cancer, bladder cancer, stroke risk, cardiac disease, etc. Smoking cessation was discussed at length and highly encouraged. Various options to aid cessation was discussed including use of Chantix, Nicotrol, nicotine products, laser therapy, hypnosis, Wellbutrin, etc.\par \par problem 5: poor breathing mechanics\par -Recommended Wim Hof and Buteyko breathing techniques \par -Proper breathing techniques were reviewed with an emphasis of exhalation. Patient instructed to breath in for 1 second and out for four seconds. Patient was encouraged to not talk while walking. \par \par problem 6 : out of shape / overweight (improved) \par - recommended "10-Day Detox" by Harinder Campbell \par -Recommend "Muniq" OTC Supplement for weight loss, energy levels, and blood sugar levels \par -Weight loss, exercise, and diet control were discussed and are highly encouraged. Treatment options were given such as, aqua therapy, and contacting a nutritionist. Recommended to use the elliptical, stationary bike, less use of treadmill. Mindful eating was explained to the patient Obesity is associated with worsening asthma, shortness of breath, and potential for cardiac disease, diabetes, and other underlying medical conditions\par \par problem 7: S/p COVID 19\par - S/p Covid 19 vaccine (Pfizer) x3; no additional booster recommended \par -COVID 19 precautions, vaccines, and boosters discussed at length and recommended with patient \par -s/p Blood work for CRP, D-dimer, Ferritin - WNL\par -s/p baby aspirin for 1 month\par -s/p COVID 19 vaccination (Pfizer: 2 of 2)\par \par Immune Support Recommendations:\par -OTC Vitamin C 500mg BID \par -OTC Quercetin 250-500mg BID \par -OTC Zinc 75-100mg per day \par -OTC Melatonin 1or 2mg a night \par -OTC Vitamin D 1-4000mg per day \par -OTC Tonic Water 8oz per day\par \par Problem 8: Supraclavicular Fullness / Lipoma\par -s/p an MRI of the neck. (WNL) \par \par problem 9: health maintenance\par -Recommended to Stay Hydrated  \par -recommended yearly flu shot - 2021\par -recommended strep pneumonia vaccines: Prevnar-13 vaccine, followed by Pneumo vaccine 23 one year following after 65\par -recommended early intervention for Upper Respiratory Infections (URIs)\par -recommended regular osteoporosis evaluations\par -recommended early dermatological evaluations\par -recommended after the age of 50 to the age of 70, colonoscopy every 5 years\par \par F/U in 3-4 months\par She is encouraged to call with any changes, concerns, or questions\par

## 2022-12-07 NOTE — ADDENDUM
[FreeTextEntry1] : Documented by Ludy Ledbetter acting as a scribe for Dr. Rafy Bond on 12/07/2022 .\par \par All medical record entries made by the Scribe were at my, Dr. Rafy Bond's, direction and personally dictated by me on 12/07/2022. I have reviewed the chart and agree that the record accurately reflects my personal performance of the history, physical exam, assessment and plan. I have also personally directed, reviewed, and agree with the discharge instructions.

## 2022-12-07 NOTE — HISTORY OF PRESENT ILLNESS
[TextBox_4] : Ms. TEJEDA is a 42 year old female presenting to the office today for f/u pulmonary evaluation. Her chief complaint is\par \par -she notes energy level decreased mid day after eating while on intermittent fasting regime \par -she notes weight is stable \par -she notes exercise is limited by myalgias and arthralgia \par -she notes progressive heartburn despite compliance with pepcid \par -she notes 2 episodes of sleep interruptions caused by heartburn\par -she notes snoring in supine position \par -she notes sleep is restful\par -she notes sinus congestion \par -she notes rhinitis mostly in the morning \par -she notes intermittent itchy eyes \par -she notes getting about 7 hrs of sleep \par -she notes her memory is poor \par -she notes her concentration is stable \par -she denies nocturnal dyspnea \par -she notes intermittent tachycardia but decreasing in frequency \par \par -she denies any headaches, nausea, vomiting, fever, chills, sweats, chest pain, chest pressure, coughing, wheezing, constipation, diarrhea, dizziness, dysphagia, itchy eyes, itchy ears, leg swelling, leg pain, or sour taste in the mouth.

## 2022-12-07 NOTE — PROCEDURE
[FreeTextEntry1] : Full PFT reveals normal flows; FEV1 was 3.35L which is 121% of predicted; normal lung volumes; normal diffusion at 24.9, which is 109% of predicted; normal flow volume loop. \par \par FENO was 13; a normal value being less than 25\par Fractional exhaled nitric oxide (FENO) is regarded as a simple, noninvasive method for assessing eosinophilic airway inflammation. Produced by a variety of cells within the lung, nitric oxide (NO) concentrations are generally low in healthy individuals. However, high concentrations of NO appear to be involved in nonspecific host defense mechanisms and chronic inflammatory diseases such as asthma. The American Thoracic Society (ATS) therefore has recommended using FENO to aid in the diagnosis and monitoring of eosinophilic airway inflammation and asthma, and for identifying steroid responsive individuals whose chronic respiratory symptoms may be caused by airway inflammation.

## 2023-01-01 NOTE — ED PROVIDER NOTE - MEDICAL DECISION MAKING DETAILS

## 2023-01-17 ENCOUNTER — TRANSCRIPTION ENCOUNTER (OUTPATIENT)
Age: 43
End: 2023-01-17

## 2023-01-17 RX ORDER — BUPROPION HYDROCHLORIDE 150 MG/1
150 TABLET, FILM COATED, EXTENDED RELEASE ORAL
Qty: 180 | Refills: 0 | Status: DISCONTINUED | COMMUNITY
Start: 2022-10-19 | End: 2023-01-17

## 2023-01-18 ENCOUNTER — TRANSCRIPTION ENCOUNTER (OUTPATIENT)
Age: 43
End: 2023-01-18

## 2023-01-19 ENCOUNTER — APPOINTMENT (OUTPATIENT)
Dept: INTERNAL MEDICINE | Facility: CLINIC | Age: 43
End: 2023-01-19
Payer: COMMERCIAL

## 2023-01-19 PROCEDURE — 99212 OFFICE O/P EST SF 10 MIN: CPT | Mod: 95

## 2023-01-19 NOTE — HISTORY OF PRESENT ILLNESS
[Home] : at home, [unfilled] , at the time of the visit. [Medical Office: ([facility])___] : at the medical office located in  [Verbal consent obtained from patient] : the patient, [unfilled] [de-identified] : 42 year F with PMH HTN presents via TEB for anxiety. Pt denies CP/SOB, fever/chills, n/v/d/c.

## 2023-01-19 NOTE — PLAN
[FreeTextEntry1] : Rx sent. Pt advised to sign up for [facility] portal to review labs and communicate any questions or concerns directly. Yearly physical and return as needed for illness, medication refills, and new or existing complaints. f/u 4-6 weeks.

## 2023-01-19 NOTE — PHYSICAL EXAM
[No Acute Distress] : no acute distress [Well Nourished] : well nourished [Well Developed] : well developed [Well-Appearing] : well-appearing [No Respiratory Distress] : no respiratory distress  [No Accessory Muscle Use] : no accessory muscle use [Coordination Grossly Intact] : coordination grossly intact [No Focal Deficits] : no focal deficits [Normal Gait] : normal gait [Normal Affect] : the affect was normal [Normal Insight/Judgement] : insight and judgment were intact [de-identified] : Telehealth Visit. Limited to visual inspection only.

## 2023-02-14 ENCOUNTER — NON-APPOINTMENT (OUTPATIENT)
Age: 43
End: 2023-02-14

## 2023-03-02 NOTE — ED PROVIDER NOTE - PMH
He had a study in August of 2020 showing mild nonobstructive atherosclerotic plaquing.  Condition stable.  He is asymptomatic neurologically.   Hypertension    Vaginal delivery

## 2023-03-10 ENCOUNTER — APPOINTMENT (OUTPATIENT)
Dept: CARDIOLOGY | Facility: CLINIC | Age: 43
End: 2023-03-10
Payer: COMMERCIAL

## 2023-03-10 ENCOUNTER — NON-APPOINTMENT (OUTPATIENT)
Age: 43
End: 2023-03-10

## 2023-03-10 VITALS
HEIGHT: 63 IN | SYSTOLIC BLOOD PRESSURE: 132 MMHG | OXYGEN SATURATION: 97 % | WEIGHT: 153 LBS | DIASTOLIC BLOOD PRESSURE: 90 MMHG | BODY MASS INDEX: 27.11 KG/M2 | HEART RATE: 71 BPM

## 2023-03-10 PROCEDURE — 99214 OFFICE O/P EST MOD 30 MIN: CPT

## 2023-03-10 PROCEDURE — 93000 ELECTROCARDIOGRAM COMPLETE: CPT

## 2023-03-10 NOTE — HISTORY OF PRESENT ILLNESS
[FreeTextEntry1] : Patient is a 39 yo woman with PMH of HTN, preeclampsia 2008, migraines with aura presents in for follow up. \par Patient reports feeling stressed out today, busy at work, \par Bp 135/80s \par HTN : BP Stable \par Continue Lisinopril 20 mg \par Encouraged Patient to monitor BP at home and keep a log and report results back to us for evaluation. Based on results, we will adjust medications as necessary. \par Additionally, encouraged heart healthy diet and exercise as tolerated.\par EKG with no acute changes.\par \par HLD : routine bliood work ( ordered)

## 2023-03-10 NOTE — CARDIOLOGY SUMMARY
[___] : [unfilled] [de-identified] : SR 70s [de-identified] : ------------------------------------------------------------------------\par STRESS TEST IMPRESSIONS:\par Exercise capacity: 10 METS, Good for age and gender.\par Chest Pain: No chest pain with exercise.\par Symptom: Fatigue.\par HR Response: Appropriate.\par BP Response: Appropriate.\par Heart Rhythm: Sinus Rhythm - 82 BPM.\par Baseline ECG: Nonspecific ST-T wave abnormality.\par ECG Changes: ST Depression: Approximately 1 mm upsloping\par in leads II, III, aVF, and V3-V6 started at 03:00 min of\par exercise at HR of 121 bpm and persisted 7:35 min into\par recovery.\par Arrhythmia: None.\par *** No previous Nuclear/Stress exam.\par ------------------------------------------------------------------------\par Confirmed on  12/23/2020 - 16:30:28 by Jose R Braun M.D. [de-identified] : ------------------------------------------------------------------------\par Conclusions: \par 1. Normal left ventricular internal dimensions and wall\par thicknesses.\par 2. Normal left ventricular systolic function. No segmental\par wall motion abnormalities.\par 3. Normal diastolic function\par 4. Inferior vena cava is normal in size (>=1.5cm, <=2.5cm)\par with  abnormal respiratory variability consistent with\par right atrial pressure 5-10mm Hg.\par 5. Normal right ventricular size and function.\par *** No previous Echo exam.\par ------------------------------------------------------------------------\par Confirmed on  12/23/2020 - 18:36:26 by Mikhail Elizondo M.D.

## 2023-03-10 NOTE — DISCUSSION/SUMMARY
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 \par \par Currently on weight watchers lost 5 lbs so far.\par HTN : BP Stable \par Continue Lisinopril 20 mg \par Encouraged Patient to monitor BP at home and keep a log and report results back to us for evaluation. Based on results, we will adjust medications as necessary. \par Additionally, encouraged heart healthy diet and exercise as tolerated.\par EKG with no acute changes.\par \par HA : On Nurtec prn \par and Motrin\par \par # HLD: Encouraged patient to continue healthy exercise and eating habits, focusing on a Mediterranean style of eating and aiming for the recommended 150 minutes per week of moderate physical activity.\par \par Routine labs ordered

## 2023-03-13 ENCOUNTER — RX RENEWAL (OUTPATIENT)
Age: 43
End: 2023-03-13

## 2023-03-13 ENCOUNTER — TRANSCRIPTION ENCOUNTER (OUTPATIENT)
Age: 43
End: 2023-03-13

## 2023-03-16 ENCOUNTER — TRANSCRIPTION ENCOUNTER (OUTPATIENT)
Age: 43
End: 2023-03-16

## 2023-03-24 ENCOUNTER — NON-APPOINTMENT (OUTPATIENT)
Age: 43
End: 2023-03-24

## 2023-03-24 LAB
ALBUMIN SERPL ELPH-MCNC: 4.7 G/DL
ALP BLD-CCNC: 79 U/L
ALT SERPL-CCNC: 35 U/L
ANION GAP SERPL CALC-SCNC: 14 MMOL/L
AST SERPL-CCNC: 24 U/L
BASOPHILS # BLD AUTO: 0.11 K/UL
BASOPHILS NFR BLD AUTO: 0.8 %
BILIRUB SERPL-MCNC: 0.7 MG/DL
BUN SERPL-MCNC: 9 MG/DL
CALCIUM SERPL-MCNC: 9.5 MG/DL
CHLORIDE SERPL-SCNC: 104 MMOL/L
CHOLEST SERPL-MCNC: 225 MG/DL
CO2 SERPL-SCNC: 21 MMOL/L
CREAT SERPL-MCNC: 0.72 MG/DL
EGFR: 106 ML/MIN/1.73M2
EOSINOPHIL # BLD AUTO: 0.21 K/UL
EOSINOPHIL NFR BLD AUTO: 1.4 %
ESTIMATED AVERAGE GLUCOSE: 123 MG/DL
GLUCOSE SERPL-MCNC: 100 MG/DL
HBA1C MFR BLD HPLC: 5.9 %
HCT VFR BLD CALC: 45.8 %
HDLC SERPL-MCNC: 57 MG/DL
HGB BLD-MCNC: 15.1 G/DL
IMM GRANULOCYTES NFR BLD AUTO: 0.5 %
LDLC SERPL CALC-MCNC: 138 MG/DL
LYMPHOCYTES # BLD AUTO: 2.13 K/UL
LYMPHOCYTES NFR BLD AUTO: 14.6 %
MAN DIFF?: NORMAL
MCHC RBC-ENTMCNC: 30.2 PG
MCHC RBC-ENTMCNC: 33 GM/DL
MCV RBC AUTO: 91.6 FL
MONOCYTES # BLD AUTO: 0.97 K/UL
MONOCYTES NFR BLD AUTO: 6.6 %
NEUTROPHILS # BLD AUTO: 11.1 K/UL
NEUTROPHILS NFR BLD AUTO: 76.1 %
NONHDLC SERPL-MCNC: 167 MG/DL
PLATELET # BLD AUTO: 245 K/UL
POTASSIUM SERPL-SCNC: 4.7 MMOL/L
PROT SERPL-MCNC: 6.9 G/DL
RBC # BLD: 5 M/UL
RBC # FLD: 14.1 %
SODIUM SERPL-SCNC: 139 MMOL/L
TRIGL SERPL-MCNC: 146 MG/DL
TSH SERPL-ACNC: 1.49 UIU/ML
WBC # FLD AUTO: 14.59 K/UL

## 2023-04-05 ENCOUNTER — APPOINTMENT (OUTPATIENT)
Dept: PULMONOLOGY | Facility: CLINIC | Age: 43
End: 2023-04-05

## 2023-04-17 ENCOUNTER — APPOINTMENT (OUTPATIENT)
Dept: NUTRITION | Facility: CLINIC | Age: 43
End: 2023-04-17
Payer: COMMERCIAL

## 2023-04-17 PROCEDURE — 97802 MEDICAL NUTRITION INDIV IN: CPT | Mod: 95

## 2023-04-26 ENCOUNTER — APPOINTMENT (OUTPATIENT)
Dept: INTERNAL MEDICINE | Facility: CLINIC | Age: 43
End: 2023-04-26
Payer: COMMERCIAL

## 2023-04-26 VITALS
RESPIRATION RATE: 16 BRPM | DIASTOLIC BLOOD PRESSURE: 86 MMHG | WEIGHT: 153 LBS | TEMPERATURE: 97 F | HEIGHT: 63 IN | OXYGEN SATURATION: 97 % | SYSTOLIC BLOOD PRESSURE: 122 MMHG | BODY MASS INDEX: 27.11 KG/M2 | HEART RATE: 80 BPM

## 2023-04-26 DIAGNOSIS — U07.1 COVID-19: ICD-10-CM

## 2023-04-26 DIAGNOSIS — S93.491D SPRAIN OF OTHER LIGAMENT OF RIGHT ANKLE, SUBSEQUENT ENCOUNTER: ICD-10-CM

## 2023-04-26 DIAGNOSIS — Z87.898 PERSONAL HISTORY OF OTHER SPECIFIED CONDITIONS: ICD-10-CM

## 2023-04-26 DIAGNOSIS — F43.22 ADJUSTMENT DISORDER WITH ANXIETY: ICD-10-CM

## 2023-04-26 DIAGNOSIS — R22.9 LOCALIZED SWELLING, MASS AND LUMP, UNSPECIFIED: ICD-10-CM

## 2023-04-26 DIAGNOSIS — Z71.6 TOBACCO ABUSE COUNSELING: ICD-10-CM

## 2023-04-26 DIAGNOSIS — R22.1 LOCALIZED SWELLING, MASS AND LUMP, NECK: ICD-10-CM

## 2023-04-26 DIAGNOSIS — Z20.828 CONTACT WITH AND (SUSPECTED) EXPOSURE TO OTHER VIRAL COMMUNICABLE DISEASES: ICD-10-CM

## 2023-04-26 DIAGNOSIS — S93.601A UNSPECIFIED SPRAIN OF RIGHT FOOT, INITIAL ENCOUNTER: ICD-10-CM

## 2023-04-26 DIAGNOSIS — S93.491A SPRAIN OF OTHER LIGAMENT OF RIGHT ANKLE, INITIAL ENCOUNTER: ICD-10-CM

## 2023-04-26 PROCEDURE — 99396 PREV VISIT EST AGE 40-64: CPT

## 2023-04-26 NOTE — PLAN
[FreeTextEntry1] : Reviewed blood work with patient. Can run a urinalysis today for yearly screening. Pending mammo/sono. Pt advised to sign up for [facility] portal to review labs and communicate any questions or concerns directly. Yearly physical and return as needed for illness, medication refills, and new or existing complaints. f/u 4 months.

## 2023-04-26 NOTE — HISTORY OF PRESENT ILLNESS
[de-identified] : 42 year F with PMH HTN, HLD, pre-DM, and anxiety presents for CPE. Had recent blood work. Pt denies CP/SOB, fever/chills, n/v/d/c.

## 2023-04-26 NOTE — HEALTH RISK ASSESSMENT
[Good] : ~his/her~  mood as  good [Yes] : Yes [Monthly or less (1 pt)] : Monthly or less (1 point) [1 or 2 (0 pts)] : 1 or 2 (0 points) [Never (0 pts)] : Never (0 points) [No] : In the past 12 months have you used drugs other than those required for medical reasons? No [0] : 2) Feeling down, depressed, or hopeless: Not at all (0) [PHQ-2 Negative - No further assessment needed] : PHQ-2 Negative - No further assessment needed [Patient reported mammogram was normal] : Patient reported mammogram was normal [Patient reported PAP Smear was normal] : Patient reported PAP Smear was normal [None] : None [With Family] : lives with family [Employed] : employed [] :  [# Of Children ___] : has [unfilled] children [Current] : Current [20 or more] : 20 or more [Audit-CScore] : 1 [[id number]] : 0 [MammogramDate] : 05/22 [PapSmearDate] : 10/21

## 2023-04-26 NOTE — PHYSICAL EXAM
[No Acute Distress] : no acute distress [Well Nourished] : well nourished [Well Developed] : well developed [Well-Appearing] : well-appearing [Normal Sclera/Conjunctiva] : normal sclera/conjunctiva [PERRL] : pupils equal round and reactive to light [EOMI] : extraocular movements intact [Normal Outer Ear/Nose] : the outer ears and nose were normal in appearance [Normal Oropharynx] : the oropharynx was normal [Normal TMs] : both tympanic membranes were normal [No JVD] : no jugular venous distention [No Lymphadenopathy] : no lymphadenopathy [Supple] : supple [Thyroid Normal, No Nodules] : the thyroid was normal and there were no nodules present [No Respiratory Distress] : no respiratory distress  [No Accessory Muscle Use] : no accessory muscle use [Clear to Auscultation] : lungs were clear to auscultation bilaterally [Normal Rate] : normal rate  [Regular Rhythm] : with a regular rhythm [Normal S1, S2] : normal S1 and S2 [No Murmur] : no murmur heard [No Abdominal Bruit] : a ~M bruit was not heard ~T in the abdomen [No Varicosities] : no varicosities [No Edema] : there was no peripheral edema [No Palpable Aorta] : no palpable aorta [No Extremity Clubbing/Cyanosis] : no extremity clubbing/cyanosis [Soft] : abdomen soft [Non Tender] : non-tender [Non-distended] : non-distended [No Masses] : no abdominal mass palpated [No HSM] : no HSM [Normal Bowel Sounds] : normal bowel sounds [Normal Posterior Cervical Nodes] : no posterior cervical lymphadenopathy [Normal Anterior Cervical Nodes] : no anterior cervical lymphadenopathy [No CVA Tenderness] : no CVA  tenderness [No Spinal Tenderness] : no spinal tenderness [No Joint Swelling] : no joint swelling [Grossly Normal Strength/Tone] : grossly normal strength/tone [No Rash] : no rash [Coordination Grossly Intact] : coordination grossly intact [No Focal Deficits] : no focal deficits [Normal Gait] : normal gait [Normal Affect] : the affect was normal [Normal Insight/Judgement] : insight and judgment were intact [de-identified] : left knee krystal deformity

## 2023-04-27 LAB
APPEARANCE: ABNORMAL
BACTERIA: ABNORMAL /HPF
BILIRUBIN URINE: NEGATIVE
BLOOD URINE: ABNORMAL
CAST: 2 /LPF
COLOR: YELLOW
EPITHELIAL CELLS: 18 /HPF
GLUCOSE QUALITATIVE U: NEGATIVE MG/DL
KETONES URINE: NEGATIVE MG/DL
LEUKOCYTE ESTERASE URINE: ABNORMAL
MICROSCOPIC-UA: NORMAL
NITRITE URINE: POSITIVE
PH URINE: 5.5
PROTEIN URINE: NEGATIVE MG/DL
RED BLOOD CELLS URINE: 3 /HPF
REVIEW: NORMAL
SPECIFIC GRAVITY URINE: 1.02
UROBILINOGEN URINE: 0.2 MG/DL
WHITE BLOOD CELLS URINE: 9 /HPF

## 2023-05-08 ENCOUNTER — APPOINTMENT (OUTPATIENT)
Dept: ULTRASOUND IMAGING | Facility: CLINIC | Age: 43
End: 2023-05-08
Payer: COMMERCIAL

## 2023-05-08 ENCOUNTER — APPOINTMENT (OUTPATIENT)
Dept: MAMMOGRAPHY | Facility: CLINIC | Age: 43
End: 2023-05-08
Payer: COMMERCIAL

## 2023-05-08 PROCEDURE — 76641 ULTRASOUND BREAST COMPLETE: CPT | Mod: 50

## 2023-05-08 PROCEDURE — 77063 BREAST TOMOSYNTHESIS BI: CPT

## 2023-05-08 PROCEDURE — 77067 SCR MAMMO BI INCL CAD: CPT

## 2023-05-22 ENCOUNTER — APPOINTMENT (OUTPATIENT)
Dept: NUTRITION | Facility: CLINIC | Age: 43
End: 2023-05-22

## 2023-06-07 ENCOUNTER — APPOINTMENT (OUTPATIENT)
Dept: ORTHOPEDIC SURGERY | Facility: CLINIC | Age: 43
End: 2023-06-07
Payer: COMMERCIAL

## 2023-06-07 ENCOUNTER — NON-APPOINTMENT (OUTPATIENT)
Age: 43
End: 2023-06-07

## 2023-06-07 VITALS — BODY MASS INDEX: 26.46 KG/M2 | HEIGHT: 64 IN | WEIGHT: 155 LBS

## 2023-06-07 PROCEDURE — 99204 OFFICE O/P NEW MOD 45 MIN: CPT

## 2023-06-07 NOTE — HISTORY OF PRESENT ILLNESS
[de-identified] : Patient is here for left elbow pain gradual in onset 2 months ago, located lateral side. Patient denies trauma prior to onset. Worse with carrying objects.\par \par The patient's past medical history, past surgical history, medications and allergies were reviewed by me today and documented accordingly. In addition, the patient's family and social history, which were noncontributory to this visit, were reviewed also. Intake form was reviewed. The patient has no family history of arthritis.

## 2023-06-07 NOTE — DISCUSSION/SUMMARY
[de-identified] : Discussed findings of today's exam and possible causes of patient's pain.  Educated patient on their most probable diagnosis of chronic intermittent left elbow pain with recent atraumatic exacerbation due to lateral epicondylitis.  Reviewed possible courses of treatment, and we collaboratively decided best course of treatment at this time will include conservative management.  Patient started on a course of topical NSAIDs, prescription given for diclofenac gel 1% to be applied to the area of pain 2-3 times daily as needed (We discussed all possible side effects of this medication).  Patient educated on how to properly wear her tennis elbow brace, she brings one to the office today, it is not well suited for her body type, she was shown online images of the correct brace to wear and how to wear it.  We discussed the role of a cortisone injection, patient would like to defer at this time.  Patient was advised she can start a home exercise program, but if she has continued pain would recommend a course of physical therapy at that time.  Follow up as needed.  Patient appreciates and agrees with current plan.\par \par I work as part of an academic orthopedic group and routinely have a physician in training (resident / fellow) working with me.  Any part of the history and physical exam performed by the physician in training was either directly reviewed and/or replicated by myself.  Any procedure performed by the physician in training was performed under my direct supervision and with the consent of the patient.\par \par This note was generated using dragon medical dictation software.  A reasonable effort has been made for proofreading its contents, but typos may still remain.  If there are any questions or points of clarification needed please notify my office.\par

## 2023-06-07 NOTE — PHYSICAL EXAM
[de-identified] : Constitutional: Well-nourished, well-developed, No acute distress\par Respiratory:  Good respiratory effort, no SOB\par Psychiatric: Pleasant and normal affect, alert and oriented x3\par Skin: Clean dry and intact\par Musculoskeletal: normal except where as noted in regional exam\par \par Left Elbow:\par APPEARANCE: no marked deformities, no swelling or malalignment\par POSITIVE TENDERNESS: + common extensor bundle at the lateral epicondyle\par NONTENDER: medial epicondyle, posterior capsules, and other areas of the elbow. \par ROM: full, mild pain with end range of wrist flexion and forearm pronation,  painless wrist ext / forearm supination. \par RESISTIVE TESTING: resisted wrist/long finger extension reproduces pain at the lateral epicondyle. resisted supination also reproduces some pain. painless resisted wrist flexion. painless resisted pronation. \par SPECIAL TESTS: stable v/v stress. neg tinel's cubital tunnel\par \par

## 2023-06-12 ENCOUNTER — APPOINTMENT (OUTPATIENT)
Dept: DERMATOLOGY | Facility: CLINIC | Age: 43
End: 2023-06-12
Payer: COMMERCIAL

## 2023-06-12 PROCEDURE — 99213 OFFICE O/P EST LOW 20 MIN: CPT

## 2023-06-30 ENCOUNTER — RX RENEWAL (OUTPATIENT)
Age: 43
End: 2023-06-30

## 2023-07-17 ENCOUNTER — NON-APPOINTMENT (OUTPATIENT)
Age: 43
End: 2023-07-17

## 2023-07-17 ENCOUNTER — APPOINTMENT (OUTPATIENT)
Dept: PULMONOLOGY | Facility: CLINIC | Age: 43
End: 2023-07-17
Payer: COMMERCIAL

## 2023-07-17 VITALS
HEART RATE: 76 BPM | WEIGHT: 160 LBS | OXYGEN SATURATION: 98 % | DIASTOLIC BLOOD PRESSURE: 80 MMHG | SYSTOLIC BLOOD PRESSURE: 118 MMHG | RESPIRATION RATE: 16 BRPM | HEIGHT: 64 IN | BODY MASS INDEX: 27.31 KG/M2 | TEMPERATURE: 97.1 F

## 2023-07-17 DIAGNOSIS — Z82.49 FAMILY HISTORY OF ISCHEMIC HEART DISEASE AND OTHER DISEASES OF THE CIRCULATORY SYSTEM: ICD-10-CM

## 2023-07-17 DIAGNOSIS — E55.9 VITAMIN D DEFICIENCY, UNSPECIFIED: ICD-10-CM

## 2023-07-17 PROCEDURE — 99214 OFFICE O/P EST MOD 30 MIN: CPT | Mod: 25

## 2023-07-17 PROCEDURE — 95012 NITRIC OXIDE EXP GAS DETER: CPT

## 2023-07-17 PROCEDURE — 94010 BREATHING CAPACITY TEST: CPT

## 2023-07-17 NOTE — PROCEDURE
[FreeTextEntry1] : PFT revealed normal flows, with a FEV1 of 2.78L, which is 95% of predicted, with a normal flow volume loop. -PFTs performed today for evaluation of asthma (07/17/2023) \par \par Feno was 13; a normal value being less than 25. Fractional exhaled nitric oxide (FENO) is regarded as a simple, noninvasive method for assessing eosinophilic airway inflammation. Produced by a variety of cells within the lung, nitric oxide (NO) concentrations are generally low in healthy individuals. However, high concentrations of NO appear to be involved in nonspecific host defense mechanisms and chronic inflammatory  diseases such as asthma. The American Thoracic Society (ATS) therefore recommended using FENO to aid in the diagnosis and monitoring of eosinophilic airway inflammation and asthma, and for identifying steroid responsive individuals whose chronic respiratory symptoms may be caused by airway inflammation

## 2023-07-17 NOTE — ADDENDUM
[FreeTextEntry1] : Documented by Elia Franklin acting as a scribe for Dr. Rafy Bond on 07/17/2023.\par \par All medical record entries made by the Scribe were at my, Dr. Rafy Bond's, direction and personally dictated by me on 07/17/2023. I have reviewed the chart and agree that the record accurately reflects my personal performance of the history, physical exam, assessment and plan. I have also personally directed, reviewed, and agree with the discharge instructions.

## 2023-07-17 NOTE — HISTORY OF PRESENT ILLNESS
[TextBox_4] : Ms. TEJEDA is a 43 year old female presenting to the office today for f/u pulmonary evaluation. Her chief complaint is\par \par -she notes exercising \par -she notes that her balance is good\par -she denies hoarseness\par -she notes needing reading glasses\par -she notes weight is her biggest complaint\par -she notes ONEILL when going up and down stairs since COVID-19 infection\par -she notes she has been taking an Rx for anxiety which has been helping her\par -she notes her second biggest complaint is her hair breaking \par \par \par -patient denies any headaches, nausea, vomiting, fever, chills, sweats, chest pain, chest pressure, palpitations, diarrhea, constipation, dysphagia, myalgias, dizziness, leg swelling, leg pain, itchy eyes, itchy ears

## 2023-07-17 NOTE — ASSESSMENT
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 improved; s/p COVID 19 vaccine x 3). - her #2 issue is post covid HR variability (imrpvoed) - #1 is hair loss\par \par Her shortness of breath is multifactorial due to:\par -poor mechanics of breathing \par -out of shape / overweight\par -pulmonary disease:\par - s/p Covid 19 Infection RADS/ bronchospasm\par -?cardiac disease (no evidence)\par \par problem 1: RADS/ bronchospasm (s/p COVID-19 infection) (controlled) - exacerbated by cold air \par -continue Symbicort 160 2 puffs inhalations BID (restart)\par -continue Ventolin 2 puffs Q6H, pre-exercise \par Asthma is believed to be caused by inherited (genetic) and environmental factor, but its exact cause is unknown. Asthma may be triggered by allergens, lung infections, or irritants in the air. Asthma triggers are different for each person \par -Inhaler technique reviewed as well as oral hygiene techniques reviewed with patient. Avoidance of cold air, extremes of temperature, rescue inhaler should be used before exercise. Order of medication reviewed with patient. Recommended use of a cool mist humidifier in the bedroom. \par \par Problem 1a: Long Covid (HR variability) - improved\par -add Allegra 180 mg QAM \par -Add Benadryl 25mg QPM\par -recommended "2 week detox" by Cristiana followed by 2 weeks of probiotics \par -Recommended "Low Histamine Diet" \par -Recommended proper hydration \par -s/p Cardiac CRP- recommended supplemental magnesium \par -If no improvement, pt is to get evaluated by cardiologist \par \par Problem 2: Allergies/ Sinuses\par -continue Qnasl 1 sniff BID or Dymista 1 sniff each nostril BID \par -s/p Blood work to include: asthma panel, food IgE panel(+), IgE level(-), eosinophil level(-), vitamin D level \par Environmental measures for allergies were encouraged including mattress and pillow covers, air purifier, and environmental controls. \par \par Problem 2A: Low Vitamin D\par -Has been associated with asthma exacerbations and increased allergic symptoms. The goal based on recent information is maintaining levels between 50-70 and low normal is 30. Recommended 50,000 units every two weeks to once a month depending on the level. \par \par Problem 3: GERD- improved\par -continue Pepcid 40 mg QHS \par -Rule of 2s: avoid eating too much, eating too late, eating too spicy, eating two hours before bed.\par -Things to avoid including overeating, spicy foods, tight clothing, eating within three hours of bed, this list is not all inclusive. \par -For treatment of reflux, possible options discussed including diet control, H2 blockers, PPIs, as well as coating motility agents discussed as treatment options. Timing of meals and proximity of last meal to sleep were discussed. If symptoms persist, a formal gastrointestinal evaluation is needed.\par \par Problem 4: Former Smoker\par -no need for Lung CA screening (does not make criteria)\par Discussed for five minutes with the patient the risks/associations with continued smoking including COPD, emphysema, shortness of breath, renal cancer, bladder cancer, stroke risk, cardiac disease, etc. Smoking cessation was discussed at length and highly encouraged. Various options to aid cessation was discussed including use of Chantix, Nicotrol, nicotine products, laser therapy, hypnosis, Wellbutrin, etc.\par \par problem 5: poor breathing mechanics\par -Recommended Wim Hof and Buteyko breathing techniques \par -Proper breathing techniques were reviewed with an emphasis of exhalation. Patient instructed to breath in for 1 second and out for four seconds. Patient was encouraged to not talk while walking. \par \par problem 6 : out of shape / overweight (improved) \par - recommended "10-Day Detox" by Harinder Campbell \par -Recommend "Muniq" OTC Supplement for weight loss, energy levels, and blood sugar levels \par -Weight loss, exercise, and diet control were discussed and are highly encouraged. Treatment options were given such as, aqua therapy, and contacting a nutritionist. Recommended to use the elliptical, stationary bike, less use of treadmill. Mindful eating was explained to the patient Obesity is associated with worsening asthma, shortness of breath, and potential for cardiac disease, diabetes, and other underlying medical conditions\par \par problem 7: S/p COVID 19\par - S/p Covid 19 vaccine (Pfizer) x3; no additional booster recommended \par -COVID 19 precautions, vaccines, and boosters discussed at length and recommended with patient \par -s/p Blood work for CRP, D-dimer, Ferritin - WNL\par -s/p baby aspirin for 1 month\par -s/p COVID 19 vaccination (Pfizer: 2 of 2)\par \par Immune Support Recommendations:\par -OTC Vitamin C 500mg BID \par -OTC Quercetin 250-500mg BID \par -OTC Zinc 75-100mg per day \par -OTC Melatonin 1or 2mg a night \par -OTC Vitamin D 1-4000mg per day \par -OTC Tonic Water 8oz per day\par \par Problem 8: Supraclavicular Fullness / Lipoma\par -s/p an MRI of the neck. (WNL) \par \par problem 9: health maintenance\par -Recommended to Stay Hydrated  \par -recommended yearly flu shot - 2023\par -recommended strep pneumonia vaccines: Prevnar-13 vaccine, followed by Pneumo vaccine 23 one year following after 65\par -recommended early intervention for Upper Respiratory Infections (URIs)\par -recommended regular osteoporosis evaluations\par -recommended early dermatological evaluations\par -recommended after the age of 50 to the age of 70, colonoscopy every 5 years\par \par F/U in 3-4 months\par She is encouraged to call with any changes, concerns, or questions\par

## 2023-07-25 ENCOUNTER — RX RENEWAL (OUTPATIENT)
Age: 43
End: 2023-07-25

## 2023-07-26 ENCOUNTER — TRANSCRIPTION ENCOUNTER (OUTPATIENT)
Age: 43
End: 2023-07-26

## 2023-07-27 ENCOUNTER — APPOINTMENT (OUTPATIENT)
Dept: ORTHOPEDIC SURGERY | Facility: CLINIC | Age: 43
End: 2023-07-27
Payer: COMMERCIAL

## 2023-07-27 VITALS
TEMPERATURE: 97.2 F | BODY MASS INDEX: 27.31 KG/M2 | DIASTOLIC BLOOD PRESSURE: 90 MMHG | SYSTOLIC BLOOD PRESSURE: 139 MMHG | HEIGHT: 64 IN | HEART RATE: 76 BPM | WEIGHT: 160 LBS

## 2023-07-27 PROCEDURE — 99214 OFFICE O/P EST MOD 30 MIN: CPT | Mod: 25

## 2023-07-27 PROCEDURE — 20551 NJX 1 TENDON ORIGIN/INSJ: CPT | Mod: LT

## 2023-07-27 NOTE — PHYSICAL EXAM
[de-identified] : Constitutional: Well-nourished, well-developed, No acute distress\par Respiratory: Good respiratory effort, no SOB\par Psychiatric: Pleasant and normal affect, alert and oriented x3\par Skin: Clean dry and intact\par Musculoskeletal: normal except where as noted in regional exam\par \par Left Elbow:\par APPEARANCE: no marked deformities, no swelling or malalignment\par POSITIVE TENDERNESS: + common extensor bundle at the lateral epicondyle\par NONTENDER: medial epicondyle, posterior capsules, and other areas of the elbow. \par ROM: full, mild pain with end range of wrist flexion and forearm pronation, painless wrist ext / forearm supination. \par RESISTIVE TESTING: resisted wrist/long finger extension reproduces pain at the lateral epicondyle. resisted supination also reproduces some pain. painless resisted wrist flexion. painless resisted pronation. \par SPECIAL TESTS: stable v/v stress. neg tinel's cubital tunnel\par \par \par

## 2023-07-27 NOTE — DISCUSSION/SUMMARY
[de-identified] : Patient was seen today for reevaluation of chronic intermittent left elbow pain with recent atraumatic exacerbation due to persisting lateral epicondylitis.  Patient has tried NSAIDs, tennis elbow brace, heat/ice, and activity modification.  However, she continues to have lateral elbow pain.  We discussed various treatment options as well as associated risk/benefits/alternatives and patient elected to proceed with cortisone injection today (see procedure note).  Informed the patient that the numbing medicine in today's injection will last for about 4-6 hours. The steroid that was injected will start to work in 1 to 2 days, peak at 1-2 weeks, and may last up to 1-2 months.  Patient will continue with other conservative measures as previously discussed.  Follow up as needed.  Patient appreciates and agrees with current plan.\par \par I work as part of an academic orthopedic group and routinely have a physician in training (resident / fellow) working with me.  Any part of the history and physical exam performed by the physician in training was either directly reviewed and/or replicated by myself.  Any procedure performed by the physician in training was performed under my direct supervision and with the consent of the patient.\par \par This note was generated using dragon medical dictation software.  A reasonable effort has been made for proofreading its contents, but typos may still remain.  If there are any questions or points of clarification needed please notify my office.\par

## 2023-07-27 NOTE — PROCEDURE
[de-identified] : Injection: Left elbow.\par Indication: Lateral epicondylitis.\par \par A discussion was had with the patient regarding this procedure and all questions were answered. All risks, benefits and alternatives were discussed. These included but were not limited to bleeding, infection, and allergic reaction. A timeout was done to ensure correct side and patient agreed to the procedure.  A [place] arleth was created on the skin utilizing a plastic needle cap to arleth the anticipated point of entry. Alcohol was used to clean the skin, and betadine was used to sterilize and prep the area in the lateral aspect of the elbow. Ethyl chloride spray was then used as a topical anesthetic. A 25-gauge needle was used to inject 1cc of 0.25% bupivacine and 1cc of 40mg/ml methylprednisolone into the extensor bundle immediately distal to the lateral epicondyle using a needling technique. A sterile bandage was then applied. The patient tolerated the procedure well and there were no complications.

## 2023-07-27 NOTE — HISTORY OF PRESENT ILLNESS
[de-identified] : Patient is here for left elbow pain follow up. Patient states that she has tried OTC treatments but feels her pain is worsening. She is interested in trying the injection at this time.

## 2023-07-27 NOTE — REVIEW OF SYSTEMS
Discharge Instructions for Open Rotator Cuff Repair   You had a procedure called open rotator cuff repair. The rotator cuff consists of the muscles and tendons that surround your shoulder. The rotator cuff keeps the top of your upper arm bone (humerus) securely in the shoulder joint. Your doctor made an incision near your shoulder blade and repaired the torn muscles or tendons in your shoulder. Here are instructions to follow when caring for your arm at home.   Activity     You may be told to do daily “pendulum swings” to improve your joint’s flexibility. Use your torso to move your arm in a [place] as it hangs straight down, make circles with your hand first in one direction, then the other.   · After surgery, rest your arm and relax for the rest of day.  · If you were asleep during the procedure (under general anesthesia), don’t operate power tools or machinery, drink alcohol, or make any major decisions for at least  24 hours after surgery.  · Wear your sling, brace, or immobilizer, as directed.  · Don’t drive a car until your doctor says it’s OK. And never drive while taking opioid pain medicine.  · Flex your wrist and wiggle your fingers often to help blood flow.  · Your doctor may recommend pendulum exercises after your surgery. If this recommendation is made:   ? Hold on to the back of a chair, or lean on a tabletop with your healthy hand.  ? Let your arm hang straight down toward the floor and use your torso to move your affected arm in a [place]. First do  20 circles in one direction. Then do  20 circles in the other direction.  ? Repeat the pendulum exercise every  2 hours  while you are awake. When you feel ready, increase the number of circles to 50 in each direction every  2 hour s.    Incision care  · Check your incision daily for redness, tenderness, or drainage.  · Don’t soak in a bathtub, hot tub, or pool until your doctor says it’s OK.  · Wait several days after your surgery to start showering, or  until your doctor says it's OK. Then shower as needed. Carefully wash your incision with soap and water. Gently pat it dry. Don’t rub the incision or apply creams or lotions.  · Your incision was closed using sutures, staples, or strips of tape. If you have sutures or staples, they may need to be removed up to  2 to 3 weeks after surgery. Allow the strips of tape to fall off on their own.    Home care  · Use pain medicine as directed by your doctor.  · Apply an ice pack or bag of frozen peas--or something similar--wrapped in a thin towel on your shoulder to reduce swelling for the first  48 hours. Leave the ice pack on for  20 minutes; then take it off for  20 minutes. Repeat as needed.  · Take your temperature daily for  7 days after your surgery. Report a fever above  100.4° F ( 38° C)  to your doctor. Fever may be a sign of infection.    Follow-up care  Follow up with your healthcare provider, or as advised.    Call 911  Call 911 right away if you have any of the following:   · Chest pain  · Shortness of breath  When to call your healthcare provider  Call your healthcare provider right away if you have any of these:   · Shoulder pain that gets worse even after taking pain medicine  · Pain or swelling in the arm on the side of your surgery  · Numbness, tingling, coolness, or blue-gray color of your arm or fingers on the side of your surgery  · Fever of  100.4° F ( 38°C) or higher, or as directed by your healthcare provider  · Shaking chills  · Drainage,oozing, redness, or warmth at the incision or bad smell  · Nausea or vomiting  [employer] last reviewed this educational content on 6/1/2020 © 2000-2020 [employer]. [address] [place] [address], [place] [address]. All rights reserved. This information is not intended as a substitute for professional medical care. Always follow your healthcare professional's instructions.      To call Physician for any signs of wound infection: Fever greater than 101 degrees  F, bleeding, separation of incision, pus like drainage, or excessive swelling.    To call Physician for difficulty breathing, vomiting, inability to tolerate oral intake      To call Physician for any pain that is not controlled by pain medication or anything worrisome    To avoid driving while taking pain medications or experiencing pain         [Joint Pain] : joint pain [Negative] : Heme/Lymph

## 2023-08-01 ENCOUNTER — TRANSCRIPTION ENCOUNTER (OUTPATIENT)
Age: 43
End: 2023-08-01

## 2023-09-26 ENCOUNTER — RX RENEWAL (OUTPATIENT)
Age: 43
End: 2023-09-26

## 2023-10-06 NOTE — IMAGING
[There are no fractures, subluxations or dislocations. No significant abnormalities are seen] : There are no fractures, subluxations or dislocations. No significant abnormalities are seen stairs to enter home/stairs within home

## 2023-10-19 ENCOUNTER — RESULT REVIEW (OUTPATIENT)
Age: 43
End: 2023-10-19

## 2023-11-27 NOTE — ED ADULT NURSE NOTE - LATERALITY
Pt called stating that he needed a refill of meloxicam (MOBIC) 15 MG tablet pt was also wondering if he could get refills so he doesn't have to keep calling back to our office.  To be called into [facility] generalized

## 2024-01-22 ENCOUNTER — APPOINTMENT (OUTPATIENT)
Dept: PULMONOLOGY | Facility: CLINIC | Age: 44
End: 2024-01-22
Payer: COMMERCIAL

## 2024-01-22 VITALS
DIASTOLIC BLOOD PRESSURE: 84 MMHG | BODY MASS INDEX: 28.51 KG/M2 | RESPIRATION RATE: 16 BRPM | SYSTOLIC BLOOD PRESSURE: 132 MMHG | HEIGHT: 64 IN | HEART RATE: 79 BPM | OXYGEN SATURATION: 98 % | WEIGHT: 167 LBS | TEMPERATURE: 97.9 F

## 2024-01-22 DIAGNOSIS — J45.909 UNSPECIFIED ASTHMA, UNCOMPLICATED: ICD-10-CM

## 2024-01-22 DIAGNOSIS — K21.9 GASTRO-ESOPHAGEAL REFLUX DISEASE W/OUT ESOPHAGITIS: ICD-10-CM

## 2024-01-22 DIAGNOSIS — J30.9 ALLERGIC RHINITIS, UNSPECIFIED: ICD-10-CM

## 2024-01-22 DIAGNOSIS — U09.9 POST COVID-19 CONDITION, UNSPECIFIED: ICD-10-CM

## 2024-01-22 PROCEDURE — 94727 GAS DIL/WSHOT DETER LNG VOL: CPT

## 2024-01-22 PROCEDURE — 95012 NITRIC OXIDE EXP GAS DETER: CPT

## 2024-01-22 PROCEDURE — 94729 DIFFUSING CAPACITY: CPT

## 2024-01-22 PROCEDURE — ZZZZZ: CPT

## 2024-01-22 PROCEDURE — 94010 BREATHING CAPACITY TEST: CPT

## 2024-01-22 PROCEDURE — 99214 OFFICE O/P EST MOD 30 MIN: CPT | Mod: 25

## 2024-01-22 NOTE — HISTORY OF PRESENT ILLNESS
[TextBox_4] : Ms. TEJEDA is a 43 year old female presenting to the office today for f/u pulmonary evaluation. Her chief complaint is  - she notes feeling generally well - she notes getting migraines which haven't been occurring as frequently as they had in the past - she notes getting some hot flashes which only last about 10 seconds  - she notes she has gained about 16 pounds since 5/2023 which she thinks is hormonal  - she notes bowels are regular - she notes frequent nasal congestion - she notes her heartrate has been more stable and her palpitations have happened less frequently - she notes her hair loss situation is getting better since she hasn't gotten her hair done   -she denies any headaches, nausea, emesis, fever, chills, sweats, chest pain, chest pressure, coughing, wheezing, constipation, diarrhea, vertigo, dysphagia, heartburn, reflux, itchy eyes, itchy ears, leg swelling, leg pain, arthralgias, myalgias, or sour taste in the mouth.

## 2024-01-22 NOTE — ASSESSMENT
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 improved; s/p COVID 19 vaccine x 3). - her #2 issue is post covid HR variability (improved) - hair loss (resolved) - improved - weight gain   Her shortness of breath is multifactorial due to: -poor mechanics of breathing -out of shape / overweight -pulmonary disease: - s/p Covid 19 Infection RADS/ bronchospasm -?cardiac disease (no evidence)  problem 1: RADS/ bronchospasm (s/p COVID-19 infection) (controlled) - exacerbated by cold air -continue Symbicort 160 2 puffs inhalations BID (restart) -continue Ventolin 2 puffs Q6H, pre-exercise Asthma is believed to be caused by inherited (genetic) and environmental factor, but its exact cause is unknown. Asthma may be triggered by allergens, lung infections, or irritants in the air. Asthma triggers are different for each person -Inhaler technique reviewed as well as oral hygiene techniques reviewed with patient. Avoidance of cold air, extremes of temperature, rescue inhaler should be used before exercise. Order of medication reviewed with patient. Recommended use of a cool mist humidifier in the bedroom.  Problem 1a: Long Covid (HR variability) - improved / resolved -add Allegra 180 mg QAM -Add Benadryl 25mg QPM -recommended "2 week detox" by Cristiana followed by 2 weeks of probiotics -Recommended "Low Histamine Diet" -Recommended proper hydration -s/p Cardiac CRP- recommended supplemental magnesium -If no improvement, pt is to get evaluated by cardiologist  Problem 2: Allergies/ Sinuses -continue Qnasl 1 sniff BID or Dymista 1 sniff each nostril BID -s/p Blood work to include: asthma panel, food IgE panel(+), IgE level(-), eosinophil level(-), vitamin D level Environmental measures for allergies were encouraged including mattress and pillow covers, air purifier, and environmental controls.  Problem 2A: Low Vitamin D -Has been associated with asthma exacerbations and increased allergic symptoms. The goal based on recent information is maintaining levels between 50-70 and low normal is 30. Recommended 50,000 units every two weeks to once a month depending on the level.  Problem 3: GERD- improved -continue Pepcid 40 mg QHS -Rule of 2s: avoid eating too much, eating too late, eating too spicy, eating two hours before bed. -Things to avoid including overeating, spicy foods, tight clothing, eating within three hours of bed, this list is not all inclusive. -For treatment of reflux, possible options discussed including diet control, H2 blockers, PPIs, as well as coating motility agents discussed as treatment options. Timing of meals and proximity of last meal to sleep were discussed. If symptoms persist, a formal gastrointestinal evaluation is needed.  Problem 4: Former Smoker -no need for Lung CA screening (does not make criteria) Discussed for five minutes with the patient the risks/associations with continued smoking including COPD, emphysema, shortness of breath, renal cancer, bladder cancer, stroke risk, cardiac disease, etc. Smoking cessation was discussed at length and highly encouraged. Various options to aid cessation was discussed including use of Chantix, Nicotrol, nicotine products, laser therapy, hypnosis, Wellbutrin, etc.  problem 5: poor breathing mechanics -Recommended Wiwilliam Hof and Buteyko breathing techniques -Proper breathing techniques were reviewed with an emphasis of exhalation. Patient instructed to breath in for 1 second and out for four seconds. Patient was encouraged to not talk while walking.  problem 6: out of shape / overweight (improved) -recommended Berberine OTC - recommended "10-Day Detox" by Harinder Campbell -Recommend "Muniq" OTC Supplement for weight loss, energy levels, and blood sugar levels -Weight loss, exercise, and diet control were discussed and are highly encouraged. Treatment options were given such as, aqua therapy, and contacting a nutritionist. Recommended to use the elliptical, stationary bike, less use of treadmill. Mindful eating was explained to the patient Obesity is associated with worsening asthma, shortness of breath, and potential for cardiac disease, diabetes, and other underlying medical conditions  problem 7: S/p COVID 19 - S/p Covid 19 vaccine (Pfizer) x3; no additional booster recommended -COVID 19 precautions, vaccines, and boosters discussed at length and recommended with patient -s/p Blood work for CRP, D-dimer, Ferritin - WNL -s/p baby aspirin for 1 month -s/p COVID 19 vaccination (Pfizer: 2 of 2)  Immune Support Recommendations: -OTC Vitamin C 500mg BID -OTC Quercetin 250-500mg BID -OTC Zinc 75-100mg per day -OTC Melatonin 1or 2mg a night -OTC Vitamin D 1-4000mg per day -OTC Tonic Water 8oz per day  Problem 8: Supraclavicular Fullness / Lipoma -s/p an MRI of the neck. (WNL)  problem 9: health maintenance -Recommended to Stay Hydrated -recommended yearly flu shot - 2023 -recommended strep pneumonia vaccines: Prevnar-13 vaccine, followed by Pneumo vaccine 23 one year following after 65 -recommended early intervention for Upper Respiratory Infections (URIs) -recommended regular osteoporosis evaluations -recommended early dermatological evaluations -recommended after the age of 50 to the age of 70, colonoscopy every 5 years  F/U in 3-4 months She is encouraged to call with any changes, concerns, or questions

## 2024-01-22 NOTE — PROCEDURE
[FreeTextEntry1] : Full PFT reveals normal flows; FEV1 was 2.86 L which is 104% of predicted; normal lung volumes; normal diffusion at 18.6, which is 79% of predicted; normal flow volume loop. PFTs were performed to evaluate for SOB  FENO was 14; a normal value being less than 25 Fractional exhaled nitric oxide (FENO) is regarded as a simple, noninvasive method for assessing eosinophilic airway inflammation. Produced by a variety of cells within the lung, nitric oxide (NO) concentrations are generally low in healthy individuals. However, high concentrations of NO appear to be involved in nonspecific host defense mechanisms and chronic inflammatory diseases such as asthma. The American Thoracic Society (ATS) therefore has strongly recommended using FENO to aid in the assessment, management, and long-term monitoring of eosinophilic airway inflammation and asthma, and for identifying steroid responsive individuals whose chronic respiratory symptoms may be caused by airway inflammation. In their 2011 clinical practice guideline, the ATS emphasizes the importance of using FENO.

## 2024-01-22 NOTE — ADDENDUM
[FreeTextEntry1] : Documented by Trevor Campos acting as a scribe for Dr. Rafy Bond on 01/22/2024.   All medical record entries made by the Scribe were at my, Dr. Rafy Bodn's, direction and personally dictated by me on 01/22/2024. I have reviewed the chart and agree that the record accurately reflects my personal performance of the history, physical exam, assessment and plan. I have also personally directed, reviewed, and agree with the discharge instructions.

## 2024-01-30 ENCOUNTER — NON-APPOINTMENT (OUTPATIENT)
Age: 44
End: 2024-01-30

## 2024-01-30 ENCOUNTER — APPOINTMENT (OUTPATIENT)
Dept: SURGICAL ONCOLOGY | Facility: CLINIC | Age: 44
End: 2024-01-30
Payer: COMMERCIAL

## 2024-01-30 VITALS
OXYGEN SATURATION: 98 % | DIASTOLIC BLOOD PRESSURE: 90 MMHG | SYSTOLIC BLOOD PRESSURE: 130 MMHG | WEIGHT: 167 LBS | HEART RATE: 87 BPM | HEIGHT: 64 IN | BODY MASS INDEX: 28.51 KG/M2

## 2024-01-30 PROCEDURE — 99243 OFF/OP CNSLTJ NEW/EST LOW 30: CPT

## 2024-01-30 NOTE — HISTORY OF PRESENT ILLNESS
[de-identified] : Ms. ARMIDA TEJEDA is a 43 year old woman here today for an initial consultation. Works as an RN at [employer].  PMH: HTN, HLD  FamHX of paternal grandmother w/ breast cancer at age 56.   B/L mammo/sono 5/2023 with negative findings, BI-RADS 1   Present today 1/30/24 with complaint right axillary accessory breast tissue.

## 2024-01-30 NOTE — CONSULT LETTER
[Consult Letter:] : I had the pleasure of evaluating your patient, [unfilled]. [Sincerely,] : Sincerely, [FreeTextEntry2] : Mauro Blair [FreeTextEntry3] : Rodriguez Gonzalez

## 2024-01-30 NOTE — ASSESSMENT
[FreeTextEntry1] : IMP: 44yo F present with right axillary accessory breast tissue.   PLAN: excision of right ectopic breast tissue  refer to Dr. Mae   All medical entries were at my, Dr. Rodriguez Gonzalez, direction.  I have reviewed the chart and agree that the record accurately reflects my personal performance of the history, physical exam, assessment, and plan.  Our office nurse practitioner was present for the duration of the office visit.

## 2024-01-30 NOTE — PHYSICAL EXAM
[Normal Neck Lymph Nodes] : normal neck lymph nodes  [Normal Axillary Lymph Nodes] : normal axillary lymph nodes [Normal] : normal appearance, no rash, nodules, vesicles, ulcers, erythema [FreeTextEntry1] : GS present during physical exam.  [de-identified] : Normal S1, S2.  Regular rate and rhythm  [de-identified] : Complete breast exam performed in supine and upright positions.  No palpable masses, tenderness, nipple discharge, inversion, deviation or enlarged axillary or supraclavicular lymph nodes bilaterally.  [de-identified] : Clear breath sounds bilaterally, normal respiratory effort.

## 2024-02-26 ENCOUNTER — APPOINTMENT (OUTPATIENT)
Dept: PLASTIC SURGERY | Facility: CLINIC | Age: 44
End: 2024-02-26
Payer: COMMERCIAL

## 2024-02-26 VITALS
HEART RATE: 96 BPM | HEIGHT: 64 IN | WEIGHT: 173 LBS | OXYGEN SATURATION: 96 % | DIASTOLIC BLOOD PRESSURE: 90 MMHG | BODY MASS INDEX: 29.53 KG/M2 | SYSTOLIC BLOOD PRESSURE: 133 MMHG | TEMPERATURE: 98.4 F

## 2024-02-26 DIAGNOSIS — Z87.891 PERSONAL HISTORY OF NICOTINE DEPENDENCE: ICD-10-CM

## 2024-02-26 PROCEDURE — 99203 OFFICE O/P NEW LOW 30 MIN: CPT

## 2024-02-26 RX ORDER — IPRATROPIUM BROMIDE 42 UG/1
0.06 SPRAY NASAL
Refills: 0 | Status: DISCONTINUED | COMMUNITY
Start: 2022-04-17 | End: 2024-02-26

## 2024-02-26 RX ORDER — AZELASTINE HYDROCHLORIDE AND FLUTICASONE PROPIONATE 137; 50 UG/1; UG/1
137-50 SPRAY, METERED NASAL
Qty: 3 | Refills: 1 | Status: DISCONTINUED | COMMUNITY
Start: 2022-12-07 | End: 2024-02-26

## 2024-02-26 RX ORDER — DICLOFENAC SODIUM 1% 10 MG/G
1 GEL TOPICAL
Qty: 1 | Refills: 0 | Status: DISCONTINUED | COMMUNITY
Start: 2023-06-07 | End: 2024-02-26

## 2024-03-27 ENCOUNTER — OUTPATIENT (OUTPATIENT)
Dept: OUTPATIENT SERVICES | Facility: HOSPITAL | Age: 44
LOS: 1 days | End: 2024-03-27
Payer: COMMERCIAL

## 2024-03-27 VITALS
SYSTOLIC BLOOD PRESSURE: 125 MMHG | DIASTOLIC BLOOD PRESSURE: 85 MMHG | HEIGHT: 64 IN | WEIGHT: 171.96 LBS | OXYGEN SATURATION: 96 % | TEMPERATURE: 98 F | RESPIRATION RATE: 16 BRPM | HEART RATE: 69 BPM

## 2024-03-27 DIAGNOSIS — S21.002S UNSPECIFIED OPEN WOUND OF LEFT BREAST, SEQUELA: ICD-10-CM

## 2024-03-27 DIAGNOSIS — I10 ESSENTIAL (PRIMARY) HYPERTENSION: ICD-10-CM

## 2024-03-27 DIAGNOSIS — Z98.890 OTHER SPECIFIED POSTPROCEDURAL STATES: Chronic | ICD-10-CM

## 2024-03-27 DIAGNOSIS — Z90.49 ACQUIRED ABSENCE OF OTHER SPECIFIED PARTS OF DIGESTIVE TRACT: Chronic | ICD-10-CM

## 2024-03-27 DIAGNOSIS — K21.9 GASTRO-ESOPHAGEAL REFLUX DISEASE WITHOUT ESOPHAGITIS: ICD-10-CM

## 2024-03-27 DIAGNOSIS — Q83.1 ACCESSORY BREAST: ICD-10-CM

## 2024-03-27 DIAGNOSIS — Z01.818 ENCOUNTER FOR OTHER PREPROCEDURAL EXAMINATION: ICD-10-CM

## 2024-03-27 LAB
ANION GAP SERPL CALC-SCNC: 10 MMOL/L — SIGNIFICANT CHANGE UP (ref 5–17)
BUN SERPL-MCNC: 9 MG/DL — SIGNIFICANT CHANGE UP (ref 7–23)
CALCIUM SERPL-MCNC: 9.3 MG/DL — SIGNIFICANT CHANGE UP (ref 8.4–10.5)
CHLORIDE SERPL-SCNC: 104 MMOL/L — SIGNIFICANT CHANGE UP (ref 96–108)
CO2 SERPL-SCNC: 26 MMOL/L — SIGNIFICANT CHANGE UP (ref 22–31)
CREAT SERPL-MCNC: 0.61 MG/DL — SIGNIFICANT CHANGE UP (ref 0.5–1.3)
EGFR: 113 ML/MIN/1.73M2 — SIGNIFICANT CHANGE UP
GLUCOSE SERPL-MCNC: 99 MG/DL — SIGNIFICANT CHANGE UP (ref 70–99)
HCT VFR BLD CALC: 43.3 % — SIGNIFICANT CHANGE UP (ref 34.5–45)
HGB BLD-MCNC: 14.5 G/DL — SIGNIFICANT CHANGE UP (ref 11.5–15.5)
MCHC RBC-ENTMCNC: 29.7 PG — SIGNIFICANT CHANGE UP (ref 27–34)
MCHC RBC-ENTMCNC: 33.5 GM/DL — SIGNIFICANT CHANGE UP (ref 32–36)
MCV RBC AUTO: 88.7 FL — SIGNIFICANT CHANGE UP (ref 80–100)
NRBC # BLD: 0 /100 WBCS — SIGNIFICANT CHANGE UP (ref 0–0)
PLATELET # BLD AUTO: 273 K/UL — SIGNIFICANT CHANGE UP (ref 150–400)
POTASSIUM SERPL-MCNC: 4 MMOL/L — SIGNIFICANT CHANGE UP (ref 3.5–5.3)
POTASSIUM SERPL-SCNC: 4 MMOL/L — SIGNIFICANT CHANGE UP (ref 3.5–5.3)
RBC # BLD: 4.88 M/UL — SIGNIFICANT CHANGE UP (ref 3.8–5.2)
RBC # FLD: 13.4 % — SIGNIFICANT CHANGE UP (ref 10.3–14.5)
SODIUM SERPL-SCNC: 140 MMOL/L — SIGNIFICANT CHANGE UP (ref 135–145)
WBC # BLD: 5.95 K/UL — SIGNIFICANT CHANGE UP (ref 3.8–10.5)
WBC # FLD AUTO: 5.95 K/UL — SIGNIFICANT CHANGE UP (ref 3.8–10.5)

## 2024-03-27 PROCEDURE — 93010 ELECTROCARDIOGRAM REPORT: CPT | Mod: NC

## 2024-03-27 PROCEDURE — 85027 COMPLETE CBC AUTOMATED: CPT

## 2024-03-27 PROCEDURE — G0463: CPT

## 2024-03-27 PROCEDURE — 93005 ELECTROCARDIOGRAM TRACING: CPT

## 2024-03-27 PROCEDURE — 80048 BASIC METABOLIC PNL TOTAL CA: CPT

## 2024-03-27 PROCEDURE — 36415 COLL VENOUS BLD VENIPUNCTURE: CPT

## 2024-03-27 RX ORDER — ACETAMINOPHEN 500 MG
2 TABLET ORAL
Qty: 0 | Refills: 0 | DISCHARGE

## 2024-03-27 RX ORDER — IBUPROFEN 200 MG
1 TABLET ORAL
Qty: 0 | Refills: 0 | DISCHARGE

## 2024-03-27 NOTE — H&P PST ADULT - HISTORY OF PRESENT ILLNESS
This s a 44 y/o female with PMHX of HTN, anxiety and GERD who presents to PST with pre-operative diagnosis of right accessory breast tissue.  Pt scheduled for excision of tissue on 4/4/24.  Denies any breast pain, palpable masses or nipple discharge b/l.  Otherwise pt feels well today and denies any acute symptoms.

## 2024-03-27 NOTE — H&P PST ADULT - LYMPHATICS COMMENTS
Significant <44% wt loss x 1 yr, moderate & severe muscle wasting & fat loss.  
accessory tissue palpated on left axillary area, nontender

## 2024-03-27 NOTE — H&P PST ADULT - NSICDXPASTSURGICALHX_GEN_ALL_CORE_FT
PAST SURGICAL HISTORY:  History of vocal cord polypectomy 2005    S/P appendectomy     S/P endometrial ablation     S/P excision of lipoma of neck - 7/2016

## 2024-04-03 ENCOUNTER — TRANSCRIPTION ENCOUNTER (OUTPATIENT)
Age: 44
End: 2024-04-03

## 2024-04-04 ENCOUNTER — TRANSCRIPTION ENCOUNTER (OUTPATIENT)
Age: 44
End: 2024-04-04

## 2024-04-04 ENCOUNTER — APPOINTMENT (OUTPATIENT)
Dept: PLASTIC SURGERY | Facility: HOSPITAL | Age: 44
End: 2024-04-04
Payer: COMMERCIAL

## 2024-04-04 ENCOUNTER — RESULT REVIEW (OUTPATIENT)
Age: 44
End: 2024-04-04

## 2024-04-04 ENCOUNTER — OUTPATIENT (OUTPATIENT)
Dept: OUTPATIENT SERVICES | Facility: HOSPITAL | Age: 44
LOS: 1 days | End: 2024-04-04
Payer: COMMERCIAL

## 2024-04-04 ENCOUNTER — APPOINTMENT (OUTPATIENT)
Dept: SURGICAL ONCOLOGY | Facility: HOSPITAL | Age: 44
End: 2024-04-04

## 2024-04-04 VITALS
TEMPERATURE: 97 F | OXYGEN SATURATION: 96 % | HEIGHT: 64 IN | HEART RATE: 76 BPM | WEIGHT: 171.96 LBS | RESPIRATION RATE: 14 BRPM | SYSTOLIC BLOOD PRESSURE: 129 MMHG | DIASTOLIC BLOOD PRESSURE: 88 MMHG

## 2024-04-04 VITALS
HEART RATE: 88 BPM | SYSTOLIC BLOOD PRESSURE: 130 MMHG | RESPIRATION RATE: 16 BRPM | DIASTOLIC BLOOD PRESSURE: 82 MMHG | OXYGEN SATURATION: 95 % | TEMPERATURE: 98 F

## 2024-04-04 DIAGNOSIS — Z90.49 ACQUIRED ABSENCE OF OTHER SPECIFIED PARTS OF DIGESTIVE TRACT: Chronic | ICD-10-CM

## 2024-04-04 DIAGNOSIS — S21.002S UNSPECIFIED OPEN WOUND OF LEFT BREAST, SEQUELA: ICD-10-CM

## 2024-04-04 DIAGNOSIS — Z98.890 OTHER SPECIFIED POSTPROCEDURAL STATES: Chronic | ICD-10-CM

## 2024-04-04 DIAGNOSIS — Q83.1 ACCESSORY BREAST: ICD-10-CM

## 2024-04-04 PROCEDURE — 14001 TIS TRNFR TRUNK 10.1-30SQCM: CPT | Mod: XP

## 2024-04-04 PROCEDURE — 14000 TIS TRNFR TRUNK 10 SQ CM/<: CPT

## 2024-04-04 PROCEDURE — 88304 TISSUE EXAM BY PATHOLOGIST: CPT

## 2024-04-04 PROCEDURE — 88304 TISSUE EXAM BY PATHOLOGIST: CPT | Mod: 26

## 2024-04-04 PROCEDURE — 21556 EXC NECK TUM DEEP < 5 CM: CPT

## 2024-04-04 PROCEDURE — C1889: CPT

## 2024-04-04 PROCEDURE — 21552 EXC NECK LES SC 3 CM/>: CPT

## 2024-04-04 DEVICE — CLIP APPLIER ETHICON LIGACLIP 11.5" MEDIUM: Type: IMPLANTABLE DEVICE | Site: LEFT | Status: FUNCTIONAL

## 2024-04-04 DEVICE — ARISTA 3GR: Type: IMPLANTABLE DEVICE | Site: LEFT | Status: FUNCTIONAL

## 2024-04-04 RX ORDER — SODIUM CHLORIDE 9 MG/ML
1000 INJECTION, SOLUTION INTRAVENOUS
Refills: 0 | Status: DISCONTINUED | OUTPATIENT
Start: 2024-04-04 | End: 2024-04-04

## 2024-04-04 RX ORDER — FAMOTIDINE 10 MG/ML
1 INJECTION INTRAVENOUS
Refills: 0 | DISCHARGE

## 2024-04-04 RX ORDER — ONDANSETRON 8 MG/1
4 TABLET, FILM COATED ORAL ONCE
Refills: 0 | Status: DISCONTINUED | OUTPATIENT
Start: 2024-04-04 | End: 2024-04-04

## 2024-04-04 RX ORDER — ONDANSETRON 8 MG/1
4 TABLET, FILM COATED ORAL ONCE
Refills: 0 | Status: DISCONTINUED | OUTPATIENT
Start: 2024-04-04 | End: 2024-04-18

## 2024-04-04 RX ORDER — LISINOPRIL 2.5 MG/1
1 TABLET ORAL
Refills: 0 | DISCHARGE

## 2024-04-04 RX ORDER — HYDROMORPHONE HYDROCHLORIDE 2 MG/ML
1 INJECTION INTRAMUSCULAR; INTRAVENOUS; SUBCUTANEOUS ONCE
Refills: 0 | Status: DISCONTINUED | OUTPATIENT
Start: 2024-04-04 | End: 2024-04-04

## 2024-04-04 RX ORDER — HYDROMORPHONE HYDROCHLORIDE 2 MG/ML
0.5 INJECTION INTRAMUSCULAR; INTRAVENOUS; SUBCUTANEOUS ONCE
Refills: 0 | Status: DISCONTINUED | OUTPATIENT
Start: 2024-04-04 | End: 2024-04-04

## 2024-04-04 RX ORDER — OXYCODONE AND ACETAMINOPHEN 5; 325 MG/1; MG/1
1 TABLET ORAL ONCE
Refills: 0 | Status: DISCONTINUED | OUTPATIENT
Start: 2024-04-04 | End: 2024-04-04

## 2024-04-04 RX ORDER — CITALOPRAM 10 MG/1
1 TABLET, FILM COATED ORAL
Refills: 0 | DISCHARGE

## 2024-04-04 RX ADMIN — SODIUM CHLORIDE 50 MILLILITER(S): 9 INJECTION, SOLUTION INTRAVENOUS at 06:21

## 2024-04-04 NOTE — BRIEF OPERATIVE NOTE - OPERATION/FINDINGS
Left breast accessory axillary tissue, excision with cautery, hemostasis assured. Closure per plastic surgery team. 
accessory breast tissue

## 2024-04-04 NOTE — ASU DISCHARGE PLAN (ADULT/PEDIATRIC) - NURSING INSTRUCTIONS
Drink plenty of fluids, keep all post-op follow up appointments. Call MD with any questions or concerns

## 2024-04-04 NOTE — BRIEF OPERATIVE NOTE - NSICDXBRIEFPROCEDURE_GEN_ALL_CORE_FT
PROCEDURES:  Excision, accessory tissue, breast 04-Apr-2024 08:19:13  Mauro Ga  
PROCEDURES:  Excision, accessory tissue, breast 04-Apr-2024 08:19:13  Mauro Ga

## 2024-04-04 NOTE — PRE-ANESTHESIA EVALUATION ADULT - NSDENTALSD_ENT_ALL_CORE
+ loose right upper rear tooth per dentist. Upper/lower fixed crowns./appears normal and intact/caps/bridge/implants/loose teeth

## 2024-04-04 NOTE — ASU DISCHARGE PLAN (ADULT/PEDIATRIC) - ASU DC SPECIAL INSTRUCTIONSFT
May shower in 48 hours  No heavy lifting or strenuous activity  Monitor drain output and bring record with you to post op appointment  Postop appointment 4/8 with Dr. Mae  Pain medication was sent your pharmacy

## 2024-04-04 NOTE — BRIEF OPERATIVE NOTE - NSICDXBRIEFPREOP_GEN_ALL_CORE_FT
PRE-OP DIAGNOSIS:  Accessory breast 04-Apr-2024 08:19:20  Mauro Ga  
PRE-OP DIAGNOSIS:  Accessory breast 04-Apr-2024 08:19:20  Mauro Ga

## 2024-04-04 NOTE — BRIEF OPERATIVE NOTE - NSICDXBRIEFPOSTOP_GEN_ALL_CORE_FT
POST-OP DIAGNOSIS:  Accessory breast 04-Apr-2024 08:19:34  Mauro Ga  
POST-OP DIAGNOSIS:  Accessory breast 04-Apr-2024 08:19:34  Mauro Ga

## 2024-04-04 NOTE — PRE-ANESTHESIA EVALUATION ADULT - NSANTHADDINFOFT_GEN_ALL_CORE
Discussed GA with LMA/ETT in detail with patient and all questions sought and answered. Pt. agrees to all plans and wishes to proceed with surgical care.

## 2024-04-04 NOTE — PRE-ANESTHESIA EVALUATION ADULT - NSANTHDIETYNSD_GEN_ALL_CORE
Impression: wet AMD OD Plan: Exam/OCT show RPE rip without recurrent IRF/SRF. Dicussed obs vs Tx, pt elects maint Tx. Eylea OD today.   

12 weeks, DFE/OCT OU, Eylea OD Yes

## 2024-04-04 NOTE — ASU PATIENT PROFILE, ADULT - AS SC BRADEN SENSORY
(4) no impairment Where Do You Want The Question To Include Opioid Counseling Located?: Case Summary Tab

## 2024-04-08 ENCOUNTER — APPOINTMENT (OUTPATIENT)
Dept: PLASTIC SURGERY | Facility: CLINIC | Age: 44
End: 2024-04-08
Payer: COMMERCIAL

## 2024-04-08 VITALS
OXYGEN SATURATION: 100 % | SYSTOLIC BLOOD PRESSURE: 131 MMHG | TEMPERATURE: 98.2 F | HEIGHT: 64 IN | DIASTOLIC BLOOD PRESSURE: 86 MMHG | BODY MASS INDEX: 29.53 KG/M2 | RESPIRATION RATE: 16 BRPM | HEART RATE: 80 BPM | WEIGHT: 173 LBS

## 2024-04-08 PROCEDURE — 99024 POSTOP FOLLOW-UP VISIT: CPT

## 2024-04-10 ENCOUNTER — TRANSCRIPTION ENCOUNTER (OUTPATIENT)
Age: 44
End: 2024-04-10

## 2024-04-10 LAB — SURGICAL PATHOLOGY STUDY: SIGNIFICANT CHANGE UP

## 2024-04-11 ENCOUNTER — APPOINTMENT (OUTPATIENT)
Dept: PLASTIC SURGERY | Facility: CLINIC | Age: 44
End: 2024-04-11
Payer: COMMERCIAL

## 2024-04-11 PROCEDURE — 99024 POSTOP FOLLOW-UP VISIT: CPT

## 2024-04-12 NOTE — REASON FOR VISIT
[Post Op: _________] : a [unfilled] post op visit [FreeTextEntry1] : s/p excision of left axillary tissue on 4/4/24

## 2024-04-12 NOTE — ASSESSMENT
[FreeTextEntry1] : All medical record entries were at my, Dr. Shawn Mae, direction and personally dictated by me. I have reviewed the chart and agree that the record accurately reflects my personal performance of the history, physical exam, assessment and plan.

## 2024-04-15 ENCOUNTER — APPOINTMENT (OUTPATIENT)
Dept: PLASTIC SURGERY | Facility: CLINIC | Age: 44
End: 2024-04-15
Payer: COMMERCIAL

## 2024-04-15 VITALS
BODY MASS INDEX: 29.53 KG/M2 | SYSTOLIC BLOOD PRESSURE: 161 MMHG | TEMPERATURE: 98.5 F | WEIGHT: 173 LBS | OXYGEN SATURATION: 98 % | HEIGHT: 64 IN | HEART RATE: 89 BPM | DIASTOLIC BLOOD PRESSURE: 105 MMHG

## 2024-04-15 PROCEDURE — 99024 POSTOP FOLLOW-UP VISIT: CPT

## 2024-04-15 NOTE — ASU PATIENT PROFILE, ADULT - NSSUBSTANCEUSE_GEN_ALL_CORE_SD
Pt discharged. Discharge paperwork sent with pt. RN to RN report given to [facility]. All questions answered. Pt transported via wheelchair van back to facility.      denies drug use/caffeine

## 2024-04-17 ENCOUNTER — APPOINTMENT (OUTPATIENT)
Dept: PLASTIC SURGERY | Facility: CLINIC | Age: 44
End: 2024-04-17
Payer: COMMERCIAL

## 2024-04-17 VITALS
HEART RATE: 70 BPM | SYSTOLIC BLOOD PRESSURE: 162 MMHG | DIASTOLIC BLOOD PRESSURE: 110 MMHG | WEIGHT: 173 LBS | OXYGEN SATURATION: 98 % | BODY MASS INDEX: 29.53 KG/M2 | HEIGHT: 64 IN | TEMPERATURE: 98 F

## 2024-04-17 PROCEDURE — 99024 POSTOP FOLLOW-UP VISIT: CPT

## 2024-04-18 ENCOUNTER — APPOINTMENT (OUTPATIENT)
Dept: CARDIOLOGY | Facility: CLINIC | Age: 44
End: 2024-04-18
Payer: COMMERCIAL

## 2024-04-18 PROCEDURE — 99214 OFFICE O/P EST MOD 30 MIN: CPT

## 2024-04-18 RX ORDER — LISINOPRIL 40 MG/1
40 TABLET ORAL DAILY
Qty: 90 | Refills: 3 | Status: ACTIVE | COMMUNITY
Start: 2022-01-04 | End: 1900-01-01

## 2024-04-18 RX ORDER — FAMOTIDINE 40 MG/1
40 TABLET, FILM COATED ORAL
Qty: 90 | Refills: 1 | Status: DISCONTINUED | COMMUNITY
Start: 2022-03-09 | End: 2024-04-18

## 2024-04-18 RX ORDER — IBUPROFEN 800 MG/1
800 TABLET, FILM COATED ORAL EVERY 6 HOURS
Qty: 20 | Refills: 0 | Status: DISCONTINUED | COMMUNITY
Start: 2024-04-04 | End: 2024-04-18

## 2024-04-18 NOTE — HISTORY OF PRESENT ILLNESS
[FreeTextEntry1] : Patient presents in for management of elevated BPs.  Patient underwent left axiallary mass excision on 4/4/24 and had some complications thereafter requring drain placement on Monday 4/15/24. Patient reprots noticing elevated BPs despite taking Lisinopril 20 mg QD since Monday 4/15. But did not reach out for help ( attributed this to pain from surgery) and remained on Motrin 800 mg bid. Presently denies chest pain, shortness of breath, dizziness, lightheadedness, palpitations or near syncope or syncope, orthopnea, PND and increasing lower extremity edema. Patient carries a PMH of HTN, preeclampsia 2008, migraines.  Bp log :  4/15/24 Monday - 160/105 but attributed this to pain post procedure. 4/17/24 Noticed BPs remained elevated at the surgeon's office ( drain needed to be placed ). Of note - has been taking Motrin 800mg Bid since Monday.  4/18: Did not take Motrin today. But admits to pain at the surgical site. Drain with serousanginous fluid.   BP Rt; 188/120, Lt: 168/112 HTN : BP elevated  Increase  Lisinopril 40 mg QD   Encouraged Patient to monitor BP at home and keep a log and report results back to us for evaluation. Based on results, we will adjust medications as necessary.  Additionally, encouraged heart healthy diet and exercise as tolerated. EKG with no acute changes ( 3/27/24)

## 2024-04-18 NOTE — DISCUSSION/SUMMARY
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 Remains asymptomatic despite eelvated BPs.   Of note - has been taking Motrin 800 mg Bid since Monday for pain management.   BP Rt; 188/120, Lt: 168/112 Repeat  HTN : BP elevated  Increase  Lisinopril 40 mg QD  D/c Motrin  Tyelenol for pain management or f/u with surgeon's office for additional assitance with pain Encouraged Patient to monitor BP at home and keep a log and report results back to us for evaluation i AM.  Based on results, we will adjust medications as necessary.  Additionally, encouraged heart healthy diet and exercise as tolerated. EKG with no acute changes ( 3/27/24)  Will f/u 4/19/24.  D/w Dr. Gil.

## 2024-04-19 ENCOUNTER — TRANSCRIPTION ENCOUNTER (OUTPATIENT)
Age: 44
End: 2024-04-19

## 2024-04-19 ENCOUNTER — APPOINTMENT (OUTPATIENT)
Dept: PLASTIC SURGERY | Facility: CLINIC | Age: 44
End: 2024-04-19
Payer: COMMERCIAL

## 2024-04-19 VITALS
OXYGEN SATURATION: 97 % | DIASTOLIC BLOOD PRESSURE: 105 MMHG | SYSTOLIC BLOOD PRESSURE: 162 MMHG | HEIGHT: 64 IN | HEART RATE: 85 BPM | RESPIRATION RATE: 16 BRPM | WEIGHT: 173 LBS | BODY MASS INDEX: 29.53 KG/M2

## 2024-04-19 PROCEDURE — 99024 POSTOP FOLLOW-UP VISIT: CPT

## 2024-04-22 ENCOUNTER — APPOINTMENT (OUTPATIENT)
Dept: PLASTIC SURGERY | Facility: CLINIC | Age: 44
End: 2024-04-22
Payer: COMMERCIAL

## 2024-04-22 VITALS
HEIGHT: 64 IN | RESPIRATION RATE: 16 BRPM | HEART RATE: 90 BPM | BODY MASS INDEX: 29.53 KG/M2 | WEIGHT: 173 LBS | DIASTOLIC BLOOD PRESSURE: 95 MMHG | SYSTOLIC BLOOD PRESSURE: 149 MMHG | OXYGEN SATURATION: 99 %

## 2024-04-22 PROCEDURE — 99024 POSTOP FOLLOW-UP VISIT: CPT

## 2024-05-01 ENCOUNTER — APPOINTMENT (OUTPATIENT)
Dept: PLASTIC SURGERY | Facility: CLINIC | Age: 44
End: 2024-05-01
Payer: COMMERCIAL

## 2024-05-01 VITALS
DIASTOLIC BLOOD PRESSURE: 89 MMHG | OXYGEN SATURATION: 98 % | WEIGHT: 173 LBS | SYSTOLIC BLOOD PRESSURE: 134 MMHG | BODY MASS INDEX: 29.53 KG/M2 | RESPIRATION RATE: 16 BRPM | HEIGHT: 64 IN | HEART RATE: 93 BPM

## 2024-05-01 PROCEDURE — 99024 POSTOP FOLLOW-UP VISIT: CPT

## 2024-05-20 ENCOUNTER — TRANSCRIPTION ENCOUNTER (OUTPATIENT)
Age: 44
End: 2024-05-20

## 2024-05-20 RX ORDER — CITALOPRAM HYDROBROMIDE 20 MG/1
20 TABLET, FILM COATED ORAL
Qty: 90 | Refills: 0 | Status: ACTIVE | COMMUNITY
Start: 2023-01-19 | End: 1900-01-01

## 2024-05-29 ENCOUNTER — APPOINTMENT (OUTPATIENT)
Dept: PLASTIC SURGERY | Facility: CLINIC | Age: 44
End: 2024-05-29
Payer: COMMERCIAL

## 2024-05-29 VITALS
DIASTOLIC BLOOD PRESSURE: 87 MMHG | HEART RATE: 86 BPM | TEMPERATURE: 97.9 F | OXYGEN SATURATION: 98 % | SYSTOLIC BLOOD PRESSURE: 132 MMHG | WEIGHT: 173 LBS | BODY MASS INDEX: 29.53 KG/M2 | HEIGHT: 64 IN | RESPIRATION RATE: 16 BRPM

## 2024-05-29 PROCEDURE — 99024 POSTOP FOLLOW-UP VISIT: CPT

## 2024-06-03 ENCOUNTER — APPOINTMENT (OUTPATIENT)
Dept: INTERNAL MEDICINE | Facility: CLINIC | Age: 44
End: 2024-06-03
Payer: COMMERCIAL

## 2024-06-03 VITALS
HEART RATE: 87 BPM | SYSTOLIC BLOOD PRESSURE: 122 MMHG | BODY MASS INDEX: 30.08 KG/M2 | OXYGEN SATURATION: 98 % | HEIGHT: 63.78 IN | WEIGHT: 174 LBS | DIASTOLIC BLOOD PRESSURE: 81 MMHG | TEMPERATURE: 98 F

## 2024-06-03 DIAGNOSIS — F41.9 ANXIETY DISORDER, UNSPECIFIED: ICD-10-CM

## 2024-06-03 DIAGNOSIS — Z00.00 ENCOUNTER FOR GENERAL ADULT MEDICAL EXAMINATION W/OUT ABNORMAL FINDINGS: ICD-10-CM

## 2024-06-03 DIAGNOSIS — Z87.2 PERSONAL HISTORY OF DISEASES OF THE SKIN AND SUBCUTANEOUS TISSUE: ICD-10-CM

## 2024-06-03 DIAGNOSIS — R92.30 DENSE BREASTS, UNSPECIFIED: ICD-10-CM

## 2024-06-03 DIAGNOSIS — Z86.018 PERSONAL HISTORY OF OTHER BENIGN NEOPLASM: ICD-10-CM

## 2024-06-03 DIAGNOSIS — L67.9 HAIR COLOR AND HAIR SHAFT ABNORMALITY, UNSPECIFIED: ICD-10-CM

## 2024-06-03 DIAGNOSIS — Q83.8 OTHER CONGENITAL MALFORMATIONS OF BREAST: ICD-10-CM

## 2024-06-03 DIAGNOSIS — I10 ESSENTIAL (PRIMARY) HYPERTENSION: ICD-10-CM

## 2024-06-03 DIAGNOSIS — Z87.898 PERSONAL HISTORY OF OTHER SPECIFIED CONDITIONS: ICD-10-CM

## 2024-06-03 DIAGNOSIS — R73.03 PREDIABETES.: ICD-10-CM

## 2024-06-03 DIAGNOSIS — Q83.1 ACCESSORY BREAST: ICD-10-CM

## 2024-06-03 DIAGNOSIS — Z87.39 PERSONAL HISTORY OF OTHER DISEASES OF THE MUSCULOSKELETAL SYSTEM AND CONNECTIVE TISSUE: ICD-10-CM

## 2024-06-03 DIAGNOSIS — Z12.31 ENCOUNTER FOR SCREENING MAMMOGRAM FOR MALIGNANT NEOPLASM OF BREAST: ICD-10-CM

## 2024-06-03 DIAGNOSIS — E66.09 OTHER OBESITY DUE TO EXCESS CALORIES: ICD-10-CM

## 2024-06-03 DIAGNOSIS — F17.200 NICOTINE DEPENDENCE, UNSPECIFIED, UNCOMPLICATED: ICD-10-CM

## 2024-06-03 DIAGNOSIS — Z12.11 ENCOUNTER FOR SCREENING FOR MALIGNANT NEOPLASM OF COLON: ICD-10-CM

## 2024-06-03 DIAGNOSIS — Z12.83 ENCOUNTER FOR SCREENING FOR MALIGNANT NEOPLASM OF SKIN: ICD-10-CM

## 2024-06-03 DIAGNOSIS — R79.89 OTHER SPECIFIED ABNORMAL FINDINGS OF BLOOD CHEMISTRY: ICD-10-CM

## 2024-06-03 DIAGNOSIS — E78.5 HYPERLIPIDEMIA, UNSPECIFIED: ICD-10-CM

## 2024-06-03 DIAGNOSIS — R22.2 LOCALIZED SWELLING, MASS AND LUMP, TRUNK: ICD-10-CM

## 2024-06-03 PROCEDURE — 99396 PREV VISIT EST AGE 40-64: CPT

## 2024-06-03 PROCEDURE — 36415 COLL VENOUS BLD VENIPUNCTURE: CPT

## 2024-06-03 RX ORDER — CEFADROXIL 500 MG/1
500 CAPSULE ORAL TWICE DAILY
Qty: 14 | Refills: 0 | Status: DISCONTINUED | COMMUNITY
Start: 2024-04-15 | End: 2024-06-03

## 2024-06-03 RX ORDER — CEFADROXIL 500 MG/1
500 CAPSULE ORAL TWICE DAILY
Qty: 14 | Refills: 0 | Status: DISCONTINUED | COMMUNITY
Start: 2024-04-22 | End: 2024-06-03

## 2024-06-03 RX ORDER — OXYCODONE AND ACETAMINOPHEN 5; 325 MG/1; MG/1
5-325 TABLET ORAL
Qty: 15 | Refills: 0 | Status: DISCONTINUED | COMMUNITY
Start: 2024-04-04 | End: 2024-06-03

## 2024-06-03 RX ORDER — TIRZEPATIDE 2.5 MG/.5ML
2.5 INJECTION, SOLUTION SUBCUTANEOUS
Qty: 1 | Refills: 0 | Status: ACTIVE | COMMUNITY
Start: 2024-06-03 | End: 1900-01-01

## 2024-06-03 NOTE — HEALTH RISK ASSESSMENT
[Good] : ~his/her~  mood as  good [Yes] : Yes [Monthly or less (1 pt)] : Monthly or less (1 point) [1 or 2 (0 pts)] : 1 or 2 (0 points) [Never (0 pts)] : Never (0 points) [No] : In the past 12 months have you used drugs other than those required for medical reasons? No [0] : 2) Feeling down, depressed, or hopeless: Not at all (0) [PHQ-2 Negative - No further assessment needed] : PHQ-2 Negative - No further assessment needed [Former] : Former [20 or more] : 20 or more [< 15 Years] : < 15 Years [Patient reported mammogram was normal] : Patient reported mammogram was normal [Patient reported PAP Smear was normal] : Patient reported PAP Smear was normal [Patient reported colonoscopy was normal] : Patient reported colonoscopy was normal [None] : None [With Family] : lives with family [Employed] : employed [] :  [# Of Children ___] : has [unfilled] children [Audit-CScore] : 1 [[id number]] : 0 [ColonoscopyComments] : Due

## 2024-06-03 NOTE — PLAN
[FreeTextEntry1] : Routine blood work drawn in office and urine collected today. Cologuard ordered. Mammogram/Sonogram ordered. Check blood work today. Start Zepbound. Pt advised to sign up for [facility] portal to review labs and communicate any questions or concerns directly. Yearly physical and return as needed for illness, medication refills, and new or existing complaints.

## 2024-06-03 NOTE — HISTORY OF PRESENT ILLNESS
[de-identified] : 44 year old F with PMH HTN, HLD, pre-DM, and anxiety presents for CPE. She has gained a significant amount of weight and we discussed GLP1-RA. Pt denies CP/SOB, fever/chills, n/v/d/c.

## 2024-06-03 NOTE — PHYSICAL EXAM
[No Acute Distress] : no acute distress [Well-Appearing] : well-appearing [Normal Sclera/Conjunctiva] : normal sclera/conjunctiva [PERRL] : pupils equal round and reactive to light [EOMI] : extraocular movements intact [Normal Outer Ear/Nose] : the outer ears and nose were normal in appearance [Normal Oropharynx] : the oropharynx was normal [Normal TMs] : both tympanic membranes were normal [No JVD] : no jugular venous distention [No Lymphadenopathy] : no lymphadenopathy [Supple] : supple [Thyroid Normal, No Nodules] : the thyroid was normal and there were no nodules present [No Respiratory Distress] : no respiratory distress  [No Accessory Muscle Use] : no accessory muscle use [Clear to Auscultation] : lungs were clear to auscultation bilaterally [Normal Rate] : normal rate  [Regular Rhythm] : with a regular rhythm [Normal S1, S2] : normal S1 and S2 [No Murmur] : no murmur heard [No Abdominal Bruit] : a ~M bruit was not heard ~T in the abdomen [No Varicosities] : no varicosities [No Edema] : there was no peripheral edema [No Palpable Aorta] : no palpable aorta [No Extremity Clubbing/Cyanosis] : no extremity clubbing/cyanosis [Soft] : abdomen soft [Non Tender] : non-tender [Non-distended] : non-distended [No Masses] : no abdominal mass palpated [No HSM] : no HSM [Normal Bowel Sounds] : normal bowel sounds [Normal Posterior Cervical Nodes] : no posterior cervical lymphadenopathy [Normal Anterior Cervical Nodes] : no anterior cervical lymphadenopathy [No CVA Tenderness] : no CVA  tenderness [No Spinal Tenderness] : no spinal tenderness [No Joint Swelling] : no joint swelling [Grossly Normal Strength/Tone] : grossly normal strength/tone [No Rash] : no rash [Coordination Grossly Intact] : coordination grossly intact [No Focal Deficits] : no focal deficits [Normal Gait] : normal gait [Normal Affect] : the affect was normal [Normal Insight/Judgement] : insight and judgment were intact [de-identified] : obesity

## 2024-06-04 ENCOUNTER — TRANSCRIPTION ENCOUNTER (OUTPATIENT)
Age: 44
End: 2024-06-04

## 2024-06-04 LAB
25(OH)D3 SERPL-MCNC: 34.3 NG/ML
ALBUMIN SERPL ELPH-MCNC: 4.8 G/DL
ALP BLD-CCNC: 90 U/L
ALT SERPL-CCNC: 61 U/L
ANION GAP SERPL CALC-SCNC: 17 MMOL/L
APPEARANCE: CLEAR
AST SERPL-CCNC: 32 U/L
BACTERIA: ABNORMAL /HPF
BASOPHILS # BLD AUTO: 0.06 K/UL
BASOPHILS NFR BLD AUTO: 0.7 %
BILIRUB SERPL-MCNC: 0.4 MG/DL
BILIRUBIN URINE: NEGATIVE
BLOOD URINE: NEGATIVE
BUN SERPL-MCNC: 11 MG/DL
CALCIUM SERPL-MCNC: 9.7 MG/DL
CAST: 0 /LPF
CHLORIDE SERPL-SCNC: 103 MMOL/L
CHOLEST SERPL-MCNC: 241 MG/DL
CO2 SERPL-SCNC: 18 MMOL/L
COLOR: YELLOW
CREAT SERPL-MCNC: 0.65 MG/DL
EGFR: 111 ML/MIN/1.73M2
EOSINOPHIL # BLD AUTO: 0.2 K/UL
EOSINOPHIL NFR BLD AUTO: 2.2 %
EPITHELIAL CELLS: 3 /HPF
ESTIMATED AVERAGE GLUCOSE: 114 MG/DL
GLUCOSE QUALITATIVE U: NEGATIVE MG/DL
GLUCOSE SERPL-MCNC: 89 MG/DL
HBA1C MFR BLD HPLC: 5.6 %
HCT VFR BLD CALC: 45.8 %
HDLC SERPL-MCNC: 60 MG/DL
HGB BLD-MCNC: 14.9 G/DL
IMM GRANULOCYTES NFR BLD AUTO: 0.2 %
KETONES URINE: NEGATIVE MG/DL
LDLC SERPL CALC-MCNC: 151 MG/DL
LEUKOCYTE ESTERASE URINE: ABNORMAL
LYMPHOCYTES # BLD AUTO: 2.43 K/UL
LYMPHOCYTES NFR BLD AUTO: 26.8 %
MAN DIFF?: NORMAL
MCHC RBC-ENTMCNC: 30.6 PG
MCHC RBC-ENTMCNC: 32.5 GM/DL
MCV RBC AUTO: 94 FL
MICROSCOPIC-UA: NORMAL
MONOCYTES # BLD AUTO: 0.81 K/UL
MONOCYTES NFR BLD AUTO: 8.9 %
NEUTROPHILS # BLD AUTO: 5.55 K/UL
NEUTROPHILS NFR BLD AUTO: 61.2 %
NITRITE URINE: NEGATIVE
NONHDLC SERPL-MCNC: 181 MG/DL
PH URINE: 5.5
PLATELET # BLD AUTO: 245 K/UL
POTASSIUM SERPL-SCNC: 4.4 MMOL/L
PROT SERPL-MCNC: 7.4 G/DL
PROTEIN URINE: NEGATIVE MG/DL
RBC # BLD: 4.87 M/UL
RBC # FLD: 14.7 %
RED BLOOD CELLS URINE: 3 /HPF
SODIUM SERPL-SCNC: 138 MMOL/L
SPECIFIC GRAVITY URINE: 1.03
TRIGL SERPL-MCNC: 168 MG/DL
TSH SERPL-ACNC: 1.43 UIU/ML
UROBILINOGEN URINE: 0.2 MG/DL
WBC # FLD AUTO: 9.07 K/UL
WHITE BLOOD CELLS URINE: 0 /HPF

## 2024-06-11 ENCOUNTER — NON-APPOINTMENT (OUTPATIENT)
Age: 44
End: 2024-06-11

## 2024-06-14 ENCOUNTER — OUTPATIENT (OUTPATIENT)
Dept: OUTPATIENT SERVICES | Facility: HOSPITAL | Age: 44
LOS: 1 days | End: 2024-06-14

## 2024-06-14 ENCOUNTER — NON-APPOINTMENT (OUTPATIENT)
Age: 44
End: 2024-06-14

## 2024-06-14 ENCOUNTER — TRANSCRIPTION ENCOUNTER (OUTPATIENT)
Age: 44
End: 2024-06-14

## 2024-06-14 ENCOUNTER — APPOINTMENT (OUTPATIENT)
Dept: INTERNAL MEDICINE | Facility: CLINIC | Age: 44
End: 2024-06-14

## 2024-06-14 DIAGNOSIS — Z98.890 OTHER SPECIFIED POSTPROCEDURAL STATES: Chronic | ICD-10-CM

## 2024-06-14 DIAGNOSIS — Z90.49 ACQUIRED ABSENCE OF OTHER SPECIFIED PARTS OF DIGESTIVE TRACT: Chronic | ICD-10-CM

## 2024-06-17 ENCOUNTER — TRANSCRIPTION ENCOUNTER (OUTPATIENT)
Age: 44
End: 2024-06-17

## 2024-06-24 ENCOUNTER — TRANSCRIPTION ENCOUNTER (OUTPATIENT)
Age: 44
End: 2024-06-24

## 2024-06-24 LAB — NONINV COLON CA DNA+OCC BLD SCRN STL QL: NEGATIVE

## 2024-06-25 ENCOUNTER — TRANSCRIPTION ENCOUNTER (OUTPATIENT)
Age: 44
End: 2024-06-25

## 2024-06-27 NOTE — H&P PST ADULT - ALCOHOL USE HISTORY SINGLE SELECT
[facility]  HEART FAILURE PROGRAM      Shirin Mathews 1944    History:  Past Medical History:   Diagnosis Date    Acute idiopathic gout of left hand 5/6/2021    Acute idiopathic gout of right hand 5/6/2021    Arthritis     Blood circulation, collateral     Blood transfusion     CAD (coronary artery disease)     CHF (congestive heart failure) (HCC)     Chronic renal failure, stage 3 (moderate) ([facility])     Diabetes mellitus (HCC)     GERD (gastroesophageal reflux disease)     Hyperlipidemia     Hypertension     Leg swelling 8/14/2018    Lymphedema 8/14/2018    Pressure ulcer of both heels 1/29/2021    Deep tissue injuries to right and left heels with left>right.  Date of origin unknown.    Pressure ulcer of left heel, stage 3 ([facility]) 2/18/2021    Ulcer of right leg, limited to breakdown of skin ([facility]) 8/20/2018       ECHO:  5/9/23   Summary   Definity was administered for better endocardial definition.   Left ventricular cavity size is normal with mild concentric left ventricular   hypertrophy.   Overall, left ventricular systolic function is low normal. Ejection fraction   is visually estimated to be 50-55%.   No definitive regional wall motion abnormalities are noted.   Grade III diastolic dysfunction with elevated LV filling pressures. E/e' =   19.5.   Left atrium is dilated.   Mild aortic stenosis.   Trivial aortic regurgitation.   Mild mitral regurgitation.   Moderate tricuspid regurgitation. Estimated pulmonary artery systolic   pressure is elevated at 55 mmHg assuming a right atrial pressure of 3 mmHg.   Moderate pulmonary hypertension.    ACE/ARB/ARNi:  home med is losartan 50 mg daily- on hold with piyush  BB: coreg 6.25 mg bid  Aldosterone Antagonist:   SGLT2:     History of sleep apnea: No    [place] Screen ordered: Yes    DM History: Yes  Consult for DM educator: No      Last Hospital Admission:1/9/21 with knee pain  Code Status: full  Discharge plans: therapy recommending SNF    Family Present:  Samuel. Continue medical management.           Imaging: [unfilled]         ======================================================================  Please note that this chart entry has been generated using voice recognition software, mainly.  So please excuse brevity and/or typos.  While every effort and attempts have been made to ensure the accuracy of this automated transcription, some errors may have occurred; and certain words and phrases in transcription may not be entered as intended.  However, inadvertent computerized transcription errors may be present.  So please contact us if any clarification needed.            yes...

## 2024-07-09 ENCOUNTER — TRANSCRIPTION ENCOUNTER (OUTPATIENT)
Age: 44
End: 2024-07-09

## 2024-07-15 ENCOUNTER — APPOINTMENT (OUTPATIENT)
Dept: DERMATOLOGY | Facility: CLINIC | Age: 44
End: 2024-07-15
Payer: COMMERCIAL

## 2024-07-15 VITALS — BODY MASS INDEX: 29.88 KG/M2 | HEIGHT: 64 IN | WEIGHT: 175 LBS

## 2024-07-15 DIAGNOSIS — Z12.83 ENCOUNTER FOR SCREENING FOR MALIGNANT NEOPLASM OF SKIN: ICD-10-CM

## 2024-07-15 DIAGNOSIS — L72.9 FOLLICULAR CYST OF THE SKIN AND SUBCUTANEOUS TISSUE, UNSPECIFIED: ICD-10-CM

## 2024-07-15 DIAGNOSIS — D22.9 MELANOCYTIC NEVI, UNSPECIFIED: ICD-10-CM

## 2024-07-15 PROCEDURE — 99213 OFFICE O/P EST LOW 20 MIN: CPT

## 2024-07-29 ENCOUNTER — APPOINTMENT (OUTPATIENT)
Dept: PULMONOLOGY | Facility: CLINIC | Age: 44
End: 2024-07-29

## 2024-08-12 ENCOUNTER — RX RENEWAL (OUTPATIENT)
Age: 44
End: 2024-08-12

## 2024-08-19 ENCOUNTER — RX RENEWAL (OUTPATIENT)
Age: 44
End: 2024-08-19

## 2024-08-27 ENCOUNTER — RX RENEWAL (OUTPATIENT)
Age: 44
End: 2024-08-27

## 2024-09-09 ENCOUNTER — TRANSCRIPTION ENCOUNTER (OUTPATIENT)
Age: 44
End: 2024-09-09

## 2024-09-10 ENCOUNTER — TRANSCRIPTION ENCOUNTER (OUTPATIENT)
Age: 44
End: 2024-09-10

## 2024-09-23 ENCOUNTER — RESULT REVIEW (OUTPATIENT)
Age: 44
End: 2024-09-23

## 2024-09-23 ENCOUNTER — APPOINTMENT (OUTPATIENT)
Dept: ULTRASOUND IMAGING | Facility: CLINIC | Age: 44
End: 2024-09-23
Payer: COMMERCIAL

## 2024-09-23 ENCOUNTER — APPOINTMENT (OUTPATIENT)
Dept: MAMMOGRAPHY | Facility: CLINIC | Age: 44
End: 2024-09-23
Payer: COMMERCIAL

## 2024-09-23 PROCEDURE — 77063 BREAST TOMOSYNTHESIS BI: CPT

## 2024-09-23 PROCEDURE — 77067 SCR MAMMO BI INCL CAD: CPT

## 2024-09-23 PROCEDURE — 76641 ULTRASOUND BREAST COMPLETE: CPT | Mod: 50

## 2024-10-07 ENCOUNTER — TRANSCRIPTION ENCOUNTER (OUTPATIENT)
Age: 44
End: 2024-10-07

## 2024-10-24 ENCOUNTER — RESULT REVIEW (OUTPATIENT)
Age: 44
End: 2024-10-24

## 2024-11-04 ENCOUNTER — TRANSCRIPTION ENCOUNTER (OUTPATIENT)
Age: 44
End: 2024-11-04

## 2024-11-05 ENCOUNTER — TRANSCRIPTION ENCOUNTER (OUTPATIENT)
Age: 44
End: 2024-11-05

## 2024-11-20 ENCOUNTER — TRANSCRIPTION ENCOUNTER (OUTPATIENT)
Age: 44
End: 2024-11-20

## 2024-12-04 ENCOUNTER — TRANSCRIPTION ENCOUNTER (OUTPATIENT)
Age: 44
End: 2024-12-04

## 2024-12-04 VITALS — BODY MASS INDEX: 24.28 KG/M2 | WEIGHT: 142.2 LBS | HEIGHT: 64 IN

## 2024-12-06 ENCOUNTER — TRANSCRIPTION ENCOUNTER (OUTPATIENT)
Age: 44
End: 2024-12-06

## 2025-01-01 ENCOUNTER — TRANSCRIPTION ENCOUNTER (OUTPATIENT)
Age: 45
End: 2025-01-01

## 2025-01-06 ENCOUNTER — TRANSCRIPTION ENCOUNTER (OUTPATIENT)
Age: 45
End: 2025-01-06

## 2025-01-07 ENCOUNTER — APPOINTMENT (OUTPATIENT)
Dept: INTERNAL MEDICINE | Facility: CLINIC | Age: 45
End: 2025-01-07
Payer: COMMERCIAL

## 2025-01-07 VITALS
HEIGHT: 64 IN | HEART RATE: 80 BPM | OXYGEN SATURATION: 98 % | DIASTOLIC BLOOD PRESSURE: 82 MMHG | RESPIRATION RATE: 15 BRPM | WEIGHT: 139 LBS | SYSTOLIC BLOOD PRESSURE: 116 MMHG | TEMPERATURE: 97.8 F | BODY MASS INDEX: 23.73 KG/M2

## 2025-01-07 DIAGNOSIS — E78.5 HYPERLIPIDEMIA, UNSPECIFIED: ICD-10-CM

## 2025-01-07 DIAGNOSIS — I10 ESSENTIAL (PRIMARY) HYPERTENSION: ICD-10-CM

## 2025-01-07 DIAGNOSIS — R73.03 PREDIABETES.: ICD-10-CM

## 2025-01-07 DIAGNOSIS — Z86.39 PERSONAL HISTORY OF OTHER ENDOCRINE, NUTRITIONAL AND METABOLIC DISEASE: ICD-10-CM

## 2025-01-07 PROCEDURE — G2211 COMPLEX E/M VISIT ADD ON: CPT

## 2025-01-07 PROCEDURE — 36415 COLL VENOUS BLD VENIPUNCTURE: CPT

## 2025-01-07 PROCEDURE — 99214 OFFICE O/P EST MOD 30 MIN: CPT

## 2025-01-07 NOTE — ASSESSMENT
Patient insists in speaking to a nurse about the labs he tried doing today.    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 \par \par Her shortness of breath is multifactorial due to:\par -poor mechanics of breathing \par -out of shape / overweight\par -pulmonary disease:\par - s/p Covid 19 Infection RADS/ bronchospasm\par -?cardiac disease (no evidence)\par \par problem 1: RADS/ bronchospasm (s/p COVID-19 infection)\par -continue Symbicort 160 2 puffs inhalations BID (restart)\par -add Ventolin 2 puffs Q6H, pre-exercise \par -Inhaler technique reviewed as well as oral hygiene techniques reviewed with patient. Avoidance of cold air, extremes of temperature, rescue inhaler should be used before exercise. Order of medication reviewed with patient. Recommended use of a cool mist humidifier in the bedroom. \par \par Problem 2: Allergies/ Sinuses\par -continue Qnasl 1 sniff BID\par -s/p Blood work to include: asthma panel, food IgE panel(+), IgE level(-), eosinophil level(-), vitamin D level \par Environmental measures for allergies were encouraged including mattress and pillow covers, air purifier, and environmental controls. \par \par Problem 2A: Low Vitamin D\par -Has been associated with asthma exacerbations and increased allergic symptoms. The goal based on recent information is maintaining levels between 50-70 and low normal is 30. Recommended 50,000 units every two weeks to once a month depending on the level. \par \par Problem 3: GERD\par -Add Pepcid 40 mg QHS \par -Rule of 2s: avoid eating too much, eating too late, eating too spicy, eating two hours before bed.\par -Things to avoid including overeating, spicy foods, tight clothing, eating within three hours of bed, this list is not all inclusive. \par -For treatment of reflux, possible options discussed including diet control, H2 blockers, PPIs, as well as coating motility agents discussed as treatment options. Timing of meals and proximity of last meal to sleep were discussed. If symptoms persist, a formal gastrointestinal evaluation is needed.\par \par Problem 4: Former Smoker\par -no need for LungCA screening-does not make criteria\par \par problem : poor breathing mechanics\par -Proper breathing techniques were reviewed with an emphasis of exhalation. Patient instructed to breath in for 1 second and out for four seconds. Patient was encouraged to not talk while walking. \par \par problem 5 : out of shape / overweight\par -Weight loss, exercise, and diet control were discussed and are highly encouraged. Treatment options were given such as, aqua therapy, and contacting a nutritionist. Recommended to use the elliptical, stationary bike, less use of treadmill. Mindful eating was explained to the patient Obesity is associated with worsening asthma, shortness of breath, and potential for cardiac disease, diabetes, and other underlying medical conditions\par \par problem 6: S/p COVID 19\par -s/p Blood work for CRP, D-dimer, Ferritin - WNL\par -s/p baby aspirin for 1 month\par \par Immune Support Recommendations:\par -OTC Vitamin C 500mg BID \par -OTC Quercetin 250-500mg BID \par -OTC Zinc 75-100mg per day \par -OTC Melatonin 1or 2mg a night \par -OTC Vitamin D 1-4000mg per day \par -OTC Tonic Water 8oz per day\par \par problem 7: health maintenance\par -Recommended to Stay Hydrated  \par -recommended yearly flu shot - 10/2020\par -recommended strep pneumonia vaccines: Prevnar-13 vaccine, followed by Pneumo vaccine 23 one year following after 65\par -recommended early intervention for Upper Respiratory Infections (URIs)\par -recommended regular osteoporosis evaluations\par -recommended early dermatological evaluations\par -recommended after the age of 50 to the age of 70, colonoscopy every 5 years\par \par F/U in 3-4 months\par She is encouraged to call with any changes, concerns, or questions\par

## 2025-01-08 ENCOUNTER — TRANSCRIPTION ENCOUNTER (OUTPATIENT)
Age: 45
End: 2025-01-08

## 2025-01-08 LAB
ALBUMIN SERPL ELPH-MCNC: 4.4 G/DL
ALP BLD-CCNC: 88 U/L
ALT SERPL-CCNC: 248 U/L
ANION GAP SERPL CALC-SCNC: 14 MMOL/L
AST SERPL-CCNC: 149 U/L
BASOPHILS # BLD AUTO: 0.06 K/UL
BASOPHILS NFR BLD AUTO: 1 %
BILIRUB SERPL-MCNC: 0.8 MG/DL
BUN SERPL-MCNC: 10 MG/DL
CALCIUM SERPL-MCNC: 9.6 MG/DL
CHLORIDE SERPL-SCNC: 99 MMOL/L
CHOLEST SERPL-MCNC: 207 MG/DL
CO2 SERPL-SCNC: 23 MMOL/L
CREAT SERPL-MCNC: 0.67 MG/DL
EGFR: 110 ML/MIN/1.73M2
EOSINOPHIL # BLD AUTO: 0.17 K/UL
EOSINOPHIL NFR BLD AUTO: 2.7 %
ESTIMATED AVERAGE GLUCOSE: 108 MG/DL
GLUCOSE SERPL-MCNC: 98 MG/DL
HBA1C MFR BLD HPLC: 5.4 %
HCT VFR BLD CALC: 50.1 %
HDLC SERPL-MCNC: 65 MG/DL
HGB BLD-MCNC: 16.6 G/DL
IMM GRANULOCYTES NFR BLD AUTO: 0.2 %
LDLC SERPL CALC-MCNC: 124 MG/DL
LYMPHOCYTES # BLD AUTO: 1.74 K/UL
LYMPHOCYTES NFR BLD AUTO: 27.7 %
MAN DIFF?: NORMAL
MCHC RBC-ENTMCNC: 30.1 PG
MCHC RBC-ENTMCNC: 33.1 G/DL
MCV RBC AUTO: 90.9 FL
MONOCYTES # BLD AUTO: 0.49 K/UL
MONOCYTES NFR BLD AUTO: 7.8 %
NEUTROPHILS # BLD AUTO: 3.82 K/UL
NEUTROPHILS NFR BLD AUTO: 60.6 %
NONHDLC SERPL-MCNC: 142 MG/DL
PLATELET # BLD AUTO: 209 K/UL
POTASSIUM SERPL-SCNC: 4.3 MMOL/L
PROT SERPL-MCNC: 7 G/DL
RBC # BLD: 5.51 M/UL
RBC # FLD: 13.7 %
SODIUM SERPL-SCNC: 136 MMOL/L
TRIGL SERPL-MCNC: 103 MG/DL
TSH SERPL-ACNC: 1.54 UIU/ML
WBC # FLD AUTO: 6.29 K/UL

## 2025-01-09 ENCOUNTER — TRANSCRIPTION ENCOUNTER (OUTPATIENT)
Age: 45
End: 2025-01-09

## 2025-01-09 ENCOUNTER — APPOINTMENT (OUTPATIENT)
Dept: ULTRASOUND IMAGING | Facility: CLINIC | Age: 45
End: 2025-01-09
Payer: COMMERCIAL

## 2025-01-09 LAB
FERRITIN SERPL-MCNC: 577 NG/ML
IRON SATN MFR SERPL: 38 %
IRON SERPL-MCNC: 120 UG/DL
TIBC SERPL-MCNC: 319 UG/DL
UIBC SERPL-MCNC: 198 UG/DL

## 2025-01-09 PROCEDURE — 76700 US EXAM ABDOM COMPLETE: CPT

## 2025-01-10 ENCOUNTER — TRANSCRIPTION ENCOUNTER (OUTPATIENT)
Age: 45
End: 2025-01-10

## 2025-01-11 ENCOUNTER — APPOINTMENT (OUTPATIENT)
Dept: GASTROENTEROLOGY | Facility: CLINIC | Age: 45
End: 2025-01-11
Payer: COMMERCIAL

## 2025-01-11 VITALS
TEMPERATURE: 98.1 F | DIASTOLIC BLOOD PRESSURE: 75 MMHG | BODY MASS INDEX: 22.97 KG/M2 | OXYGEN SATURATION: 99 % | HEIGHT: 64 IN | SYSTOLIC BLOOD PRESSURE: 117 MMHG | RESPIRATION RATE: 18 BRPM | HEART RATE: 86 BPM | WEIGHT: 134.56 LBS

## 2025-01-11 DIAGNOSIS — E83.119 HEMOCHROMATOSIS, UNSPECIFIED: ICD-10-CM

## 2025-01-11 DIAGNOSIS — R74.8 ABNORMAL LEVELS OF OTHER SERUM ENZYMES: ICD-10-CM

## 2025-01-11 DIAGNOSIS — K21.9 GASTRO-ESOPHAGEAL REFLUX DISEASE W/OUT ESOPHAGITIS: ICD-10-CM

## 2025-01-11 DIAGNOSIS — D58.2 OTHER HEMOGLOBINOPATHIES: ICD-10-CM

## 2025-01-11 PROCEDURE — 99204 OFFICE O/P NEW MOD 45 MIN: CPT

## 2025-01-11 RX ORDER — TIRZEPATIDE 10 MG/.5ML
10 INJECTION, SOLUTION SUBCUTANEOUS
Qty: 2 | Refills: 0 | Status: ACTIVE | COMMUNITY
Start: 2024-12-05

## 2025-01-11 RX ORDER — AMOXICILLIN 500 MG/1
500 CAPSULE ORAL
Qty: 21 | Refills: 0 | Status: ACTIVE | COMMUNITY
Start: 2024-08-06

## 2025-01-11 RX ORDER — TIRZEPATIDE 5 MG/.5ML
5 INJECTION, SOLUTION SUBCUTANEOUS
Qty: 2 | Refills: 0 | Status: ACTIVE | COMMUNITY
Start: 2024-08-03

## 2025-01-15 LAB
AFP-TM SERPL-MCNC: 3.4 NG/ML
HBV CORE IGG+IGM SER QL: NONREACTIVE
HCV AB SER QL: NONREACTIVE
HCV S/CO RATIO: 0.07 S/CO
MITOCHONDRIA AB SER IF-ACNC: NORMAL
SMOOTH MUSCLE AB SER QL IF: NORMAL

## 2025-01-16 ENCOUNTER — TRANSCRIPTION ENCOUNTER (OUTPATIENT)
Age: 45
End: 2025-01-16

## 2025-01-16 DIAGNOSIS — Z14.8 GENETIC CARRIER OF OTHER DISEASE: ICD-10-CM

## 2025-01-16 LAB — TM INTERPRETATION: NORMAL

## 2025-01-21 ENCOUNTER — APPOINTMENT (OUTPATIENT)
Dept: MRI IMAGING | Facility: CLINIC | Age: 45
End: 2025-01-21
Payer: COMMERCIAL

## 2025-01-21 PROCEDURE — A9585: CPT

## 2025-01-21 PROCEDURE — 74183 MRI ABD W/O CNTR FLWD CNTR: CPT

## 2025-01-24 ENCOUNTER — TRANSCRIPTION ENCOUNTER (OUTPATIENT)
Age: 45
End: 2025-01-24

## 2025-01-27 ENCOUNTER — TRANSCRIPTION ENCOUNTER (OUTPATIENT)
Age: 45
End: 2025-01-27

## 2025-02-11 ENCOUNTER — TRANSCRIPTION ENCOUNTER (OUTPATIENT)
Age: 45
End: 2025-02-11

## 2025-02-13 ENCOUNTER — TRANSCRIPTION ENCOUNTER (OUTPATIENT)
Age: 45
End: 2025-02-13

## 2025-02-14 ENCOUNTER — LABORATORY RESULT (OUTPATIENT)
Age: 45
End: 2025-02-14

## 2025-02-14 ENCOUNTER — TRANSCRIPTION ENCOUNTER (OUTPATIENT)
Age: 45
End: 2025-02-14

## 2025-02-14 DIAGNOSIS — N39.0 URINARY TRACT INFECTION, SITE NOT SPECIFIED: ICD-10-CM

## 2025-02-14 RX ORDER — SULFAMETHOXAZOLE AND TRIMETHOPRIM 800; 160 MG/1; MG/1
800-160 TABLET ORAL TWICE DAILY
Qty: 6 | Refills: 0 | Status: ACTIVE | COMMUNITY
Start: 2025-02-14 | End: 1900-01-01

## 2025-02-19 ENCOUNTER — TRANSCRIPTION ENCOUNTER (OUTPATIENT)
Age: 45
End: 2025-02-19

## 2025-02-19 LAB
APPEARANCE: CLEAR
BILIRUBIN URINE: NEGATIVE
BLOOD URINE: NEGATIVE
COLOR: YELLOW
GLUCOSE QUALITATIVE U: NEGATIVE MG/DL
KETONES URINE: NEGATIVE MG/DL
LEUKOCYTE ESTERASE URINE: NEGATIVE
NITRITE URINE: POSITIVE
PH URINE: 5.5
PROTEIN URINE: NEGATIVE MG/DL
SPECIFIC GRAVITY URINE: 1.03
UROBILINOGEN URINE: 0.2 MG/DL

## 2025-02-19 RX ORDER — CEFUROXIME AXETIL 250 MG/1
250 TABLET ORAL
Qty: 10 | Refills: 0 | Status: ACTIVE | COMMUNITY
Start: 2025-02-19 | End: 1900-01-01

## 2025-02-20 ENCOUNTER — TRANSCRIPTION ENCOUNTER (OUTPATIENT)
Age: 45
End: 2025-02-20

## 2025-02-24 ENCOUNTER — APPOINTMENT (OUTPATIENT)
Dept: GASTROENTEROLOGY | Facility: CLINIC | Age: 45
End: 2025-02-24
Payer: COMMERCIAL

## 2025-02-24 DIAGNOSIS — U09.9 POST COVID-19 CONDITION, UNSPECIFIED: ICD-10-CM

## 2025-02-24 DIAGNOSIS — Z14.8 GENETIC CARRIER OF OTHER DISEASE: ICD-10-CM

## 2025-02-24 DIAGNOSIS — R73.03 PREDIABETES.: ICD-10-CM

## 2025-02-24 DIAGNOSIS — K21.9 GASTRO-ESOPHAGEAL REFLUX DISEASE W/OUT ESOPHAGITIS: ICD-10-CM

## 2025-02-24 DIAGNOSIS — R74.8 ABNORMAL LEVELS OF OTHER SERUM ENZYMES: ICD-10-CM

## 2025-02-24 PROCEDURE — 99214 OFFICE O/P EST MOD 30 MIN: CPT | Mod: 95

## 2025-03-25 ENCOUNTER — TRANSCRIPTION ENCOUNTER (OUTPATIENT)
Age: 45
End: 2025-03-25

## 2025-05-13 ENCOUNTER — TRANSCRIPTION ENCOUNTER (OUTPATIENT)
Age: 45
End: 2025-05-13

## 2025-07-06 NOTE — PLAN
None [FreeTextEntry1] : Pt is medically optimized for surgery.\par \par Blood work was reviewed. ECG is unchanged from previous and pt has followed with cardiology and pulmonology since COVID-19 and had workup. She isn't currently requiring any additional treatment.\par \par Contact my office if any issues or any part of the PST is missing.

## 2025-07-11 ENCOUNTER — RX RENEWAL (OUTPATIENT)
Age: 45
End: 2025-07-11

## 2025-08-04 ENCOUNTER — APPOINTMENT (OUTPATIENT)
Dept: CARDIOLOGY | Facility: CLINIC | Age: 45
End: 2025-08-04
Payer: COMMERCIAL

## 2025-08-04 VITALS
HEIGHT: 64 IN | BODY MASS INDEX: 22.53 KG/M2 | OXYGEN SATURATION: 100 % | WEIGHT: 132 LBS | SYSTOLIC BLOOD PRESSURE: 125 MMHG | HEART RATE: 72 BPM | DIASTOLIC BLOOD PRESSURE: 85 MMHG

## 2025-08-04 DIAGNOSIS — R06.09 OTHER FORMS OF DYSPNEA: ICD-10-CM

## 2025-08-04 PROCEDURE — 99215 OFFICE O/P EST HI 40 MIN: CPT

## 2025-08-04 PROCEDURE — 93000 ELECTROCARDIOGRAM COMPLETE: CPT

## 2025-08-06 LAB
ALBUMIN SERPL ELPH-MCNC: 4.5 G/DL
ALP BLD-CCNC: 72 U/L
ALT SERPL-CCNC: 63 U/L
ANION GAP SERPL CALC-SCNC: 14 MMOL/L
AST SERPL-CCNC: 53 U/L
BILIRUB SERPL-MCNC: 0.6 MG/DL
BUN SERPL-MCNC: 10 MG/DL
CALCIUM SERPL-MCNC: 9.5 MG/DL
CHLORIDE SERPL-SCNC: 103 MMOL/L
CHOLEST SERPL-MCNC: 242 MG/DL
CO2 SERPL-SCNC: 22 MMOL/L
CREAT SERPL-MCNC: 0.75 MG/DL
CRP SERPL-MCNC: <3 MG/L
EGFRCR SERPLBLD CKD-EPI 2021: 99 ML/MIN/1.73M2
ESTIMATED AVERAGE GLUCOSE: 103 MG/DL
GLUCOSE SERPL-MCNC: 92 MG/DL
HBA1C MFR BLD HPLC: 5.2 %
HCT VFR BLD CALC: 42.4 %
HDLC SERPL-MCNC: 71 MG/DL
HGB BLD-MCNC: 14.1 G/DL
LDLC SERPL-MCNC: 151 MG/DL
MCHC RBC-ENTMCNC: 31.2 PG
MCHC RBC-ENTMCNC: 33.3 G/DL
MCV RBC AUTO: 93.8 FL
NONHDLC SERPL-MCNC: 171 MG/DL
PLATELET # BLD AUTO: 258 K/UL
POTASSIUM SERPL-SCNC: 4.3 MMOL/L
PROT SERPL-MCNC: 6.9 G/DL
RBC # BLD: 4.52 M/UL
RBC # FLD: 13.1 %
SODIUM SERPL-SCNC: 139 MMOL/L
TRIGL SERPL-MCNC: 110 MG/DL
TSH SERPL-ACNC: 2.11 UIU/ML
WBC # FLD AUTO: 5.89 K/UL

## 2025-08-08 LAB — APO LP(A) SERPL-MCNC: <9 NMOL/L

## 2025-08-13 ENCOUNTER — TRANSCRIPTION ENCOUNTER (OUTPATIENT)
Age: 45
End: 2025-08-13

## 2025-08-18 ENCOUNTER — APPOINTMENT (OUTPATIENT)
Dept: INTERNAL MEDICINE | Facility: CLINIC | Age: 45
End: 2025-08-18
Payer: COMMERCIAL

## 2025-08-18 VITALS
SYSTOLIC BLOOD PRESSURE: 93 MMHG | OXYGEN SATURATION: 99 % | DIASTOLIC BLOOD PRESSURE: 63 MMHG | BODY MASS INDEX: 22.36 KG/M2 | HEIGHT: 64 IN | TEMPERATURE: 98.2 F | HEART RATE: 82 BPM | RESPIRATION RATE: 17 BRPM | WEIGHT: 131 LBS

## 2025-08-18 DIAGNOSIS — E78.5 HYPERLIPIDEMIA, UNSPECIFIED: ICD-10-CM

## 2025-08-18 DIAGNOSIS — Z00.00 ENCOUNTER FOR GENERAL ADULT MEDICAL EXAMINATION W/OUT ABNORMAL FINDINGS: ICD-10-CM

## 2025-08-18 DIAGNOSIS — Z87.891 PERSONAL HISTORY OF NICOTINE DEPENDENCE: ICD-10-CM

## 2025-08-18 DIAGNOSIS — Z12.11 ENCOUNTER FOR SCREENING FOR MALIGNANT NEOPLASM OF COLON: ICD-10-CM

## 2025-08-18 DIAGNOSIS — F41.9 ANXIETY DISORDER, UNSPECIFIED: ICD-10-CM

## 2025-08-18 DIAGNOSIS — I10 ESSENTIAL (PRIMARY) HYPERTENSION: ICD-10-CM

## 2025-08-18 DIAGNOSIS — Z86.39 PERSONAL HISTORY OF OTHER ENDOCRINE, NUTRITIONAL AND METABOLIC DISEASE: ICD-10-CM

## 2025-08-18 DIAGNOSIS — E55.9 VITAMIN D DEFICIENCY, UNSPECIFIED: ICD-10-CM

## 2025-08-18 DIAGNOSIS — N39.0 URINARY TRACT INFECTION, SITE NOT SPECIFIED: ICD-10-CM

## 2025-08-18 DIAGNOSIS — Z12.31 ENCOUNTER FOR SCREENING MAMMOGRAM FOR MALIGNANT NEOPLASM OF BREAST: ICD-10-CM

## 2025-08-18 DIAGNOSIS — F17.200 NICOTINE DEPENDENCE, UNSPECIFIED, UNCOMPLICATED: ICD-10-CM

## 2025-08-18 DIAGNOSIS — R73.03 PREDIABETES.: ICD-10-CM

## 2025-08-18 PROCEDURE — 99396 PREV VISIT EST AGE 40-64: CPT

## 2025-08-19 LAB
CREAT SPEC-SCNC: 133 MG/DL
CREAT/PROT UR: 0.1 RATIO
PROT UR-MCNC: 11 MG/DL

## 2025-08-20 LAB
APPEARANCE: CLEAR
BACTERIA: ABNORMAL /HPF
BILIRUBIN URINE: NEGATIVE
BLOOD URINE: NEGATIVE
CAST: 1 /LPF
COLOR: YELLOW
EPITHELIAL CELLS: 19 /HPF
GLUCOSE QUALITATIVE U: NEGATIVE MG/DL
HYALINE CASTS: PRESENT
KETONES URINE: NEGATIVE MG/DL
LEUKOCYTE ESTERASE URINE: ABNORMAL
MICROSCOPIC-UA: NORMAL
NITRITE URINE: NEGATIVE
PH URINE: 7.5
PROTEIN URINE: NORMAL MG/DL
RED BLOOD CELLS URINE: 3 /HPF
REVIEW: NORMAL
SPECIFIC GRAVITY URINE: 1.02
UROBILINOGEN URINE: 0.2 MG/DL
WHITE BLOOD CELLS URINE: 0 /HPF

## 2025-09-03 ENCOUNTER — TRANSCRIPTION ENCOUNTER (OUTPATIENT)
Age: 45
End: 2025-09-03

## 2025-09-04 ENCOUNTER — TRANSCRIPTION ENCOUNTER (OUTPATIENT)
Age: 45
End: 2025-09-04

## 2025-09-08 ENCOUNTER — APPOINTMENT (OUTPATIENT)
Dept: MAMMOGRAPHY | Facility: CLINIC | Age: 45
End: 2025-09-08
Payer: COMMERCIAL

## 2025-09-08 ENCOUNTER — RESULT REVIEW (OUTPATIENT)
Age: 45
End: 2025-09-08

## 2025-09-08 ENCOUNTER — APPOINTMENT (OUTPATIENT)
Dept: ULTRASOUND IMAGING | Facility: CLINIC | Age: 45
End: 2025-09-08
Payer: COMMERCIAL

## 2025-09-08 PROCEDURE — 77067 SCR MAMMO BI INCL CAD: CPT

## 2025-09-08 PROCEDURE — 77063 BREAST TOMOSYNTHESIS BI: CPT

## 2025-09-08 PROCEDURE — 76641 ULTRASOUND BREAST COMPLETE: CPT | Mod: 50

## (undated) DEVICE — SUT SILK 2-0 30" SH

## (undated) DEVICE — PREP BETADINE SPONGE STICKS

## (undated) DEVICE — DRAPE 3/4 SHEET 52X76"

## (undated) DEVICE — DRSG MAMMARY SUPPORT SM SIZE 1

## (undated) DEVICE — POSITIONER PATIENT SAFETY STRAP 3X60"

## (undated) DEVICE — SUT MONOCRYL 4-0 27" PS-2 UNDYED

## (undated) DEVICE — SOL IRR POUR H2O 500ML

## (undated) DEVICE — GLV 7.5 PROTEXIS (WHITE)

## (undated) DEVICE — PACK MINOR NO DRAPE

## (undated) DEVICE — DRSG MAMMARY SUPPORT XL SIZE 5

## (undated) DEVICE — RADIOGRAPHY DVC SPEC TRANSPEC

## (undated) DEVICE — VENODYNE/SCD SLEEVE CALF MEDIUM

## (undated) DEVICE — ELCTR BOVIE TIP BLADE INSULATED 2.75" EDGE

## (undated) DEVICE — PROTECTOR HEEL / ELBOW FLUFFY

## (undated) DEVICE — POSITIONER FOAM EGG CRATE ULNAR 2PCS (PINK)

## (undated) DEVICE — DRSG DERMABOND 0.7ML

## (undated) DEVICE — DRAPE CHEST BREAST 106" X 122"

## (undated) DEVICE — NDL HYPO SAFE 25G X 5/8" (ORANGE)

## (undated) DEVICE — SUT VICRYL 3-0 27" SH UNDYED

## (undated) DEVICE — SOL IRR POUR NS 0.9% 500ML

## (undated) DEVICE — BASIN SET DOUBLE

## (undated) DEVICE — DRSG TEGADERM 4X4.75"

## (undated) DEVICE — NDL HYPO SAFE 25G X 1" (ORANGE)

## (undated) DEVICE — ELCTR GROUNDING PAD ADULT COVIDIEN

## (undated) DEVICE — DRSG STERISTRIPS 0.5 X 4"

## (undated) DEVICE — GLV 5.5 PROTEXIS (WHITE)

## (undated) DEVICE — DRAPE INSTRUMENT POUCH 6.75" X 11"

## (undated) DEVICE — LABELS BLANK W PEN

## (undated) DEVICE — NDL HYPO SAFE 22G X 1.5" (BLACK)

## (undated) DEVICE — LAP PAD W RING 18 X 18"

## (undated) DEVICE — DRSG MAMMARY SUPPORT MED SIZE 3

## (undated) DEVICE — DRAPE TOWEL BLUE 17" X 24"

## (undated) DEVICE — WARMING BLANKET FULL ADULT

## (undated) DEVICE — DRSG MAMMARY SUPPORT MED SIZE 2

## (undated) DEVICE — ELCTR BOVIE TIP BLADE INSULATED 4" EDGE

## (undated) DEVICE — GOWN LG

## (undated) DEVICE — DRAPE SPLIT SHEET 77" X 120"

## (undated) DEVICE — WARMING BLANKET LOWER ADULT

## (undated) DEVICE — DRSG MAMMARY SUPPORT LG SIZE 4

## (undated) DEVICE — ELCTR ROCKER SWITCH PENCIL BLUE 10FT